# Patient Record
Sex: FEMALE | Race: BLACK OR AFRICAN AMERICAN | NOT HISPANIC OR LATINO | Employment: STUDENT | URBAN - METROPOLITAN AREA
[De-identification: names, ages, dates, MRNs, and addresses within clinical notes are randomized per-mention and may not be internally consistent; named-entity substitution may affect disease eponyms.]

---
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
## 2020-03-17 ENCOUNTER — CLINICAL SUPPORT (OUTPATIENT)
Dept: URGENT CARE | Facility: CLINIC | Age: 20
End: 2020-03-17

## 2020-03-17 VITALS
WEIGHT: 257 LBS | RESPIRATION RATE: 19 BRPM | HEIGHT: 58 IN | BODY MASS INDEX: 53.95 KG/M2 | TEMPERATURE: 98 F | OXYGEN SATURATION: 98 % | DIASTOLIC BLOOD PRESSURE: 76 MMHG | HEART RATE: 92 BPM | SYSTOLIC BLOOD PRESSURE: 120 MMHG

## 2020-03-17 DIAGNOSIS — T78.40XA ALLERGIC REACTION, INITIAL ENCOUNTER: Primary | ICD-10-CM

## 2020-03-17 PROCEDURE — 99204 PR OFFICE/OUTPT VISIT, NEW, LEVL IV, 45-59 MIN: ICD-10-PCS | Mod: 25,S$GLB,, | Performed by: NURSE PRACTITIONER

## 2020-03-17 PROCEDURE — 99204 OFFICE O/P NEW MOD 45 MIN: CPT | Mod: 25,S$GLB,, | Performed by: NURSE PRACTITIONER

## 2020-03-17 RX ORDER — DEXAMETHASONE SODIUM PHOSPHATE 4 MG/ML
8 INJECTION, SOLUTION INTRA-ARTICULAR; INTRALESIONAL; INTRAMUSCULAR; INTRAVENOUS; SOFT TISSUE ONCE
Status: COMPLETED | OUTPATIENT
Start: 2020-03-17 | End: 2020-03-17

## 2020-03-17 RX ORDER — PREDNISONE 20 MG/1
40 TABLET ORAL DAILY
Qty: 10 TABLET | Refills: 0 | Status: SHIPPED | OUTPATIENT
Start: 2020-03-17 | End: 2020-03-22

## 2020-03-17 RX ADMIN — DEXAMETHASONE SODIUM PHOSPHATE 8 MG: 4 INJECTION, SOLUTION INTRA-ARTICULAR; INTRALESIONAL; INTRAMUSCULAR; INTRAVENOUS; SOFT TISSUE at 10:03

## 2020-03-17 NOTE — PATIENT INSTRUCTIONS
General Allergic Reactions  An allergic reaction is a set of symptoms caused by an allergen. An allergen is something that causes a persons immune system to react. When a person comes in contact with an allergen, it causes the body to release chemicals. These include the chemical histamine. Histamine causes swelling and itching. It may affect the entire body. This is called a general allergic reaction. Often symptoms affect only 1 part of the body. This is called a local allergic reaction.  You are having an allergic reaction. Almost anything can cause one. Different people are allergic to different things. It is usually something that you ate or swallowed, came into contact with by getting or putting it on your skin or clothes, or something you breathed in the air. This can be very annoying and sometimes scary.  Most of us think of allergic reactions when we have a rash or itchy skin. Symptoms can include:  · Itching of the eyes, nose, and roof of the mouth  · Runny or stuffy nose  · Watery eyes   · Sneezing or coughing   · A blocked feeling in the ear  · Red, itchy rash called hives  · Red and purple spots  · Rash, redness, welts, blisters  · Itching, burning, stinging, pain  · Dry, flaky, cracking, scaly skin  Severe symptoms include:  · Swelling of the face, lips, or other parts of the body  · Hoarse voice  · Trouble swallowing, feeling like your throat is closing  · Trouble breathing, wheezing  · Nausea, vomiting, diarrhea, stomach cramps  · Feeling faint or lightheaded, rapid heart rate  Sometimes the cause may be obvious. But there are so many things that can cause a reaction that you may not be able to figure out. The most important things to help find your allergen are:  · Remembering when it started  · What you were doing at the time or just before that  · Any activities you were involved in  · Any new products or contacts  Below are some common causes. But remember that almost anything can cause a  reaction. You may not even be aware that you came into contact with one of these things:  · Dust, mold, pollen  · Plants (common ones are poison ivy and poison oak, but there are many others)   · Animals  · Foods such as shrimp, shellfish, peanuts, milk products, gluten, and eggs. Also food colorings, flavorings, and additives.  · Insect bites or stings such as bees, mosquitos, fleas, ticks  · Medicines such as penicillin, sulfa medicines, amoxicillin, aspirin, and ibuprofen. But any medicine can cause a reaction.  · Jewelry such as nickel or gold. This can be new, or something youve worn for a while, including zippers and buttons.  · Latex such as in gloves, clothes, toys, balloons, or some tapes. Some people allergic to latex may also have problems with foods like bananas, avocados, kiwi, papaya, or chestnuts.  · Lotions, perfumes, cosmetics, soaps, shampoos, skincare products, nail products  · Chemicals or dyes in clothing, linen, , hair dyes, soaps, iodine  Many viruses and common colds can cause a rash that is not an allergic reaction. Sometimes it is hard to tell the difference between allergies, sensitivity, or an intolerance to something. This is especially true with food. Many things can cause diarrhea, vomiting, stomach cramps, and skin irritation.  Home care    The goal of treatment is to help relieve the symptoms and get you feeling better. The rash will usually fade over several days. But it can sometimes last a couple of weeks. Over the next couple of days, there may be times when it is gets a little worse, and then better again. Here are some things to do:  · If you know what you are allergic to, stay away from it. Future reactions could be worse than this one.  · Avoid tight clothing and anything that heats up your skin (hot showers or baths, direct sunlight). Heat will make itching worse.  · An ice pack will relieve local areas of intense itching and redness. To make an ice pack, put ice  cubes in a plastic bag that seals at the top. Wrap it in a thin, clean towel. Dont put the ice directly on the skin because it can damage the skin.  · Oral diphenhydramine is an over-the-counter antihistamine sold at pharmacy and grocery stores. Unless a prescription antihistamine was given, diphenhydramine may be used to reduce itching if large areas of the skin are involved. It may make you sleepy. So be careful using it in the daytime or when going to school, working, or driving. Note: Dont use diphenhydramine if you have glaucoma or if you are a man with trouble urinating due to an enlarged prostate. There are other antihistamines that wont make you so sleepy. These are good choices for daytime use. Ask your pharmacist for suggestions.  · Dont use diphenhydramine cream on your skin. It can cause a further reaction in some people.  · To help prevent an infection, don't scratch the affected area. Scratching may worsen the reaction and damage your skin. It can also lead to an infection. Always check the affected for signs of an infection.  · Call your healthcare provider and ask what you can use to help decrease the itching.  · To decrease allergic reactions, try the following:    · Use heat-steam to clean your home  · Use high-efficiency particulate (HEPA) vacuums and filters  · Stay away from food and pet triggers  · Kill any cockroaches  · Clean your house often  Follow-up care  Follow up with your healthcare provider, or as advised. If you had a severe reaction today, or if you have had several mild to medium allergic reactions in the past, ask your provider about allergy testing. This can help you find out what you are allergic to. If your reaction included dizziness, fainting, or trouble breathing or swallowing, ask your provider about carrying auto-injectable epinephrine.  Call 911  Call 911 if any of these occur:  · Trouble breathing or swallowing, wheezing  · Cool, moist, pale skin  · Shortness of  breath  · Hoarse voice or trouble speaking  · Confused   · Very drowsy or trouble awakening  · Fainting or loss of consciousness  · Rapid heart rate  · Feeling of dizziness or weakness or a sudden drop in blood pressure  · Feeling of doom  · Feeling lightheaded  · Severe nausea or vomiting, or diarrhea  · Seizure  · Swelling in the face, eyelids, lips, mouth, throat or tongue  · Drooling  When to seek medical advice  Call your healthcare provider right away if any of these occur:  · Spreading areas of itching, redness or swelling  · Nausea or stomach cramps or abdominal pain  · Continuing or recurring symptoms  · Spreading areas of redness, swelling, or itching  · Signs of infection at the affected site:  ¨ Spreading redness  ¨ Increased pain or swelling  ¨ Fluid or colored drainage from the site  ¨ Fever of 100.4°F (38°C) or above lasting for 24 to 48 hours, or as directed by your provider  Date Last Reviewed: 3/1/2017  © 8083-3471 [employer]. [address] [place] [address], [place] [address]. All rights reserved. This information is not intended as a substitute for professional medical care. Always follow your healthcare professional's instructions.

## 2020-03-17 NOTE — PROGRESS NOTES
"Subjective:       Patient ID: Britta Mariee is a 19 y.o. female.    Vitals:  height is 4' 10" (1.473 m) and weight is 116.6 kg (257 lb). Her temperature is 98.2 °F (36.8 °C). Her blood pressure is 120/76 and her pulse is 92. Her respiration is 19 and oxygen saturation is 98%.     Chief Complaint: Allergic Reaction    Allergic Reaction   Episode onset: last night  Associated with: hair dye  Associated symptoms include itching and a rash. Pertinent negatives include no chest pain, coughing, diarrhea or vomiting. Her rash is characterized by: diffuse and raised.Treatments tried: benadryl. The treatment provided no relief. There is no history of seasonal allergies. There is no swelling present.       Constitution: Negative for chills, fatigue and fever.   HENT: Negative for congestion and sore throat.    Neck: Negative for painful lymph nodes.   Cardiovascular: Negative for chest pain and leg swelling.   Eyes: Negative for double vision and blurred vision.   Respiratory: Negative for cough and shortness of breath.    Gastrointestinal: Negative for nausea, vomiting and diarrhea.   Genitourinary: Negative for dysuria, frequency, urgency and history of kidney stones.   Musculoskeletal: Negative for joint pain, joint swelling, muscle cramps and muscle ache.   Skin: Positive for rash and erythema. Negative for color change, pale and bruising.   Allergic/Immunologic: Negative for seasonal allergies.   Neurological: Negative for dizziness, history of vertigo, light-headedness, passing out and headaches.   Hematologic/Lymphatic: Negative for swollen lymph nodes.   Psychiatric/Behavioral: Negative for nervous/anxious, sleep disturbance and depression. The patient is not nervous/anxious.        Objective:      Physical Exam   Constitutional: She is oriented to person, place, and time. She appears well-developed and well-nourished. She is cooperative.  Non-toxic appearance. She does not appear ill. No distress.   HENT: "   Head: Normocephalic and atraumatic.   Right Ear: Hearing, tympanic membrane, external ear and ear canal normal.   Left Ear: Hearing, tympanic membrane, external ear and ear canal normal.   Nose: Nose normal. No mucosal edema, rhinorrhea or nasal deformity. No epistaxis. Right sinus exhibits no maxillary sinus tenderness and no frontal sinus tenderness. Left sinus exhibits no maxillary sinus tenderness and no frontal sinus tenderness.   Mouth/Throat: Uvula is midline, oropharynx is clear and moist and mucous membranes are normal. No trismus in the jaw. Normal dentition. No uvula swelling. No posterior oropharyngeal erythema.   Eyes: Pupils are equal, round, and reactive to light. Conjunctivae, EOM and lids are normal. Right eye exhibits no discharge. Left eye exhibits no discharge. No scleral icterus.   Neck: Trachea normal, normal range of motion, full passive range of motion without pain and phonation normal. Neck supple.   Cardiovascular: Normal rate, regular rhythm, normal heart sounds, intact distal pulses and normal pulses.   Pulmonary/Chest: Effort normal and breath sounds normal. No respiratory distress. She has no rales. She exhibits no tenderness.   Abdominal: Soft. Normal appearance and bowel sounds are normal. She exhibits no distension, no pulsatile midline mass and no mass. There is no tenderness.   Musculoskeletal: Normal range of motion. She exhibits no edema or deformity.   Neurological: She is alert and oriented to person, place, and time. She exhibits normal muscle tone. Coordination normal.   Skin: Skin is warm, dry, intact, not diaphoretic, not pale and rash.   Erythematous urticarial rash to scalp. Lesions:  erythema  Psychiatric: She has a normal mood and affect. Her speech is normal and behavior is normal. Judgment and thought content normal. Cognition and memory are normal.   Nursing note and vitals reviewed.        Assessment:       1. Allergic reaction, initial encounter        Plan:      decadron injection, rx; prednisone.    Allergic reaction, initial encounter  -     predniSONE (DELTASONE) 20 MG tablet; Take 2 tablets (40 mg total) by mouth once daily. for 5 days  Dispense: 10 tablet; Refill: 0

## 2020-12-16 ENCOUNTER — OCCUPATIONAL HEALTH (OUTPATIENT)
Dept: URGENT CARE | Facility: CLINIC | Age: 20
End: 2020-12-16

## 2020-12-16 PROCEDURE — 86580 PR  TB INTRADERMAL TEST: ICD-10-PCS | Mod: S$GLB,,, | Performed by: EMERGENCY MEDICINE

## 2020-12-16 PROCEDURE — 86580 TB INTRADERMAL TEST: CPT | Mod: S$GLB,,, | Performed by: EMERGENCY MEDICINE

## 2020-12-16 PROCEDURE — 80305 DRUG TEST PRSMV DIR OPT OBS: CPT | Mod: S$GLB,,, | Performed by: EMERGENCY MEDICINE

## 2020-12-16 PROCEDURE — 80305 PR NON-DOT DRUG SCREENS: ICD-10-PCS | Mod: S$GLB,,, | Performed by: EMERGENCY MEDICINE

## 2021-04-29 ENCOUNTER — PATIENT MESSAGE (OUTPATIENT)
Dept: RESEARCH | Facility: HOSPITAL | Age: 21
End: 2021-04-29

## 2022-01-24 ENCOUNTER — HOSPITAL ENCOUNTER (EMERGENCY)
Facility: HOSPITAL | Age: 22
Discharge: HOME | End: 2022-01-24
Attending: EMERGENCY MEDICINE
Payer: MEDICAID

## 2022-01-24 VITALS
SYSTOLIC BLOOD PRESSURE: 118 MMHG | RESPIRATION RATE: 17 BRPM | TEMPERATURE: 98 F | OXYGEN SATURATION: 99 % | HEART RATE: 94 BPM | DIASTOLIC BLOOD PRESSURE: 72 MMHG | WEIGHT: 257 LBS | BODY MASS INDEX: 53.71 KG/M2

## 2022-01-24 DIAGNOSIS — S92.212A CLOSED DISPLACED FRACTURE OF CUBOID OF LEFT FOOT, INITIAL ENCOUNTER: Primary | ICD-10-CM
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
## 2022-01-24 PROCEDURE — 25000003 PHARM REV CODE 250: Performed by: EMERGENCY MEDICINE

## 2022-01-24 PROCEDURE — 99284 EMERGENCY DEPT VISIT MOD MDM: CPT | Mod: 25

## 2022-01-24 PROCEDURE — 29515 APPLICATION SHORT LEG SPLINT: CPT | Mod: LT

## 2022-01-24 RX ORDER — HYDROCODONE BITARTRATE AND ACETAMINOPHEN 10; 325 MG/1; MG/1
1 TABLET ORAL
Status: COMPLETED | OUTPATIENT
Start: 2022-01-24 | End: 2022-01-24

## 2022-01-24 RX ORDER — HYDROCODONE BITARTRATE AND ACETAMINOPHEN 10; 325 MG/1; MG/1
1 TABLET ORAL EVERY 6 HOURS PRN
Qty: 12 TABLET | Refills: 0 | Status: SHIPPED | OUTPATIENT
Start: 2022-01-24 | End: 2022-01-27

## 2022-01-24 RX ADMIN — HYDROCODONE BITARTRATE AND ACETAMINOPHEN 1 TABLET: 10; 325 TABLET ORAL at 06:01

## 2022-01-24 NOTE — DISCHARGE INSTRUCTIONS
Please elevate her leg as much as possible.  Please follow-up with the foot specialist.  You may need surgery for this particular fracture of your foot.  Please follow the instructions given above.

## 2022-01-24 NOTE — ED PROVIDER NOTES
Encounter Date: 1/24/2022       History     Chief Complaint   Patient presents with    Fall     Mechanical trip over dog and scraped leg. Pt denies hit to head or loc.      Chief complaint is ankle pain.  The patient states she tripped at her house over a dog while walking down the sidewalk at 9:00 p.m. and now is here for evaluation for swelling to the left foot.  The patient on exam while in the stretcher definitely has swelling to the dorsum of the left foot and to the base of the 5th meta tarsal.  She denies hitting her head.  No complaints of head pain neck pain chest pain.  She only has pain to the left foot.  She arrived by ambulance.        Review of patient's allergies indicates:  No Known Allergies  Past Medical History:   Diagnosis Date    Asthma      Past Surgical History:   Procedure Laterality Date    WISDOM TOOTH EXTRACTION       Family History   Problem Relation Age of Onset    Hypertension Sister     Breast cancer Neg Hx     Cancer Neg Hx     Colon cancer Neg Hx     Miscarriages / Stillbirths Neg Hx     Diabetes Neg Hx      Social History     Tobacco Use    Smoking status: Never Smoker   Substance Use Topics    Alcohol use: No    Drug use: No     Review of Systems   Constitutional: Negative for chills and fever.   HENT: Negative for ear pain, rhinorrhea and sore throat.    Eyes: Negative for pain and visual disturbance.   Respiratory: Negative for cough and shortness of breath.    Cardiovascular: Negative for chest pain and palpitations.   Gastrointestinal: Negative for abdominal pain, constipation, diarrhea, nausea and vomiting.   Genitourinary: Negative for dysuria, frequency, hematuria and urgency.   Musculoskeletal: Negative for back pain, joint swelling and myalgias.        Left foot pain   Skin: Negative for rash.   Neurological: Negative for dizziness, seizures, weakness and headaches.   Psychiatric/Behavioral: Negative for dysphoric mood. The patient is not nervous/anxious.         Physical Exam     Initial Vitals [01/24/22 0437]   BP Pulse Resp Temp SpO2   118/72 94 18 98.4 °F (36.9 °C) 99 %      MAP       --         Physical Exam    Nursing note and vitals reviewed.  Constitutional: She appears well-developed and well-nourished.   HENT:   Head: Normocephalic and atraumatic.   Eyes: Conjunctivae, EOM and lids are normal. Pupils are equal, round, and reactive to light.   Neck: Trachea normal. Neck supple. No thyroid mass and no thyromegaly present.   Normal range of motion.  Cardiovascular: Normal rate, regular rhythm and normal heart sounds.   Pulmonary/Chest: Effort normal and breath sounds normal.   Abdominal: Abdomen is soft. Normal appearance. There is no abdominal tenderness.   Musculoskeletal:         General: Normal range of motion.      Cervical back: Normal range of motion and neck supple.      Comments: Patient tender over the dorsum of the mid left foot and at the base of the 5th meta tarsal.  Neurovascular function intact.  No skin tear noted.  Neurovascular function intact     Neurological: She is alert and oriented to person, place, and time. She has normal strength and normal reflexes. No cranial nerve deficit or sensory deficit.   Skin: Skin is warm and dry.   Psychiatric: She has a normal mood and affect. Her speech is normal and behavior is normal. Judgment and thought content normal.         ED Course   Procedures  Labs Reviewed - No data to display       Imaging Results          CT Foot Without Contrast Left (Final result)  Result time 01/24/22 07:02:42    Final result by Dae Chase IV, MD (01/24/22 07:02:42)                 Narrative:    CMS MANDATED QUALITY DATA - CT RADIATION 436    All CT scans at this facility utilize dose modulation, iterative reconstruction, and/or weight based dosing when appropriate to reduce radiation dose to as low as reasonably achievable.    CT of the foot without contrast    HISTORY: Fracture.    Thin section imaging is performed  and viewed in multiple planes.    There is an acute comminuted fracture involving the cuboid. A longitudinally oriented component of the fracture extends from the proximal to the distal articular surfaces. The fracture cleft measures up to 4 to 5 mm. There are additional fracture lines extending to the dorsal and plantar surfaces as well as the articulation with the third cuneiform. There appears to be a small nondisplaced fracture along the anterior process of the calcaneus. The remaining osseous structures appear intact. No dislocation or osseous destructive process is observed.    There is edema within the soft tissues of the foot.    IMPRESSION:    Acute comminuted fracture of the cuboid with extension to articular surfaces as above. There is slight displacement of fracture fragments. Longitudinally oriented fracture cleft measures up to 4 to 5 mm in width.    Probable small nondisplaced fracture involving the far anterior aspect of the calcaneus.    Soft tissue edema.    Electronically signed by:  Dae Chase MD  1/24/2022 7:02 AM CST Workstation: [id number]                             X-Ray Ankle Complete Left (Final result)  Result time 01/24/22 07:03:29    Final result by Dae Chase IV, MD (01/24/22 07:03:29)                 Narrative:    Left ankle, 3 views    HISTORY: Ankle pain.    The bones are appropriately mineralized. The joint space appears well-maintained. No acute fracture, dislocation or osseous destruction is observed.    IMPRESSION:    No acute osseous abnormality.    Electronically signed by:  Dae Chase MD  1/24/2022 7:03 AM CST Workstation: [id number]                             X-Ray Foot Complete Left (Final result)  Result time 01/24/22 07:04:59    Final result by Dae Chase IV, MD (01/24/22 07:04:59)                 Narrative:    Left foot, 3 views    HISTORY: Recent trauma, foot pain.    There are linear lucencies extending through the cuboid bone compatible with an acute  mildly comminuted fracture. No additional fracture is observed and no dislocation or osseous destruction is demonstrated. There is soft tissue swelling within the foot.    IMPRESSION:    Acute comminuted cuboid fracture.    Electronically signed by:  Dae Chase MD  1/24/2022 7:04 AM CST Workstation: [id number]                               Medications   HYDROcodone-acetaminophen  mg per tablet 1 tablet (1 tablet Oral Given 1/24/22 0602)     Medical Decision Making:   ED Management:  The patient was splinted because she has a fracture of her cuboid.                      Clinical Impression:   Final diagnoses:  [W19.XXXA] Fall  [S92.212A] Closed displaced fracture of cuboid of left foot, initial encounter (Primary)          ED Disposition Condition    Discharge Stable        ED Prescriptions     Medication Sig Dispense Start Date End Date Auth. Provider    HYDROcodone-acetaminophen (NORCO)  mg per tablet Take 1 tablet by mouth every 6 (six) hours as needed for Pain. 12 tablet 1/24/2022 1/27/2022 Torito Weinstein MD        Follow-up Information     Follow up With Specialties Details Why Contact Info    Medardo Shell DPM Podiatry, Surgery Schedule an appointment as soon as possible for a visit in 2 days  [address] [facility]  [address]  [facility] [address]  [phone]             Torito Weinstein MD  01/25/22 0139

## 2022-01-27 NOTE — PATIENT INSTRUCTIONS
Foot Fracture  You have a broken bone (fracture) in your foot. This will cause pain, swelling, and often bruising. It will usually take about 4 to 8 weeks to heal. A foot fracture may be treated with a special shoe, splint, cast, or boot.  Home care  Follow these guidelines when caring for yourself at home:  · You may be given a splint, cast, shoe, or boot to keep the injured area from moving. Unless you were told otherwise, use crutches or a walker. Dont put weight on the injured foot until your health care provider says you can do so. (You can rent crutches and a walker at many pharmacies and surgical or orthopedic supply stores.) Dont put weight on a splint, or it will break.  · Keep your leg elevated to reduce pain and swelling. When sleeping, put a pillow under the injured leg. When sitting, support the injured leg so it is above your waist. This is very important during the first 2 days (48 hours).  · Put an ice pack on the injured area. Do this for 20 minutes every 1 to 2 hours the first day for pain relief. You can make an ice pack by wrapping a plastic bag of ice cubes in a thin towel. As the ice melts, be careful that the splint, cast, boot, or shoe doesnt get wet. You can place the ice pack directly over the splint or cast. Unless told otherwise, you can open the boot or shoe to apply the ice pack. Continue using the ice pack 3 to 4 times a day for the next 2 days. Then use the ice pack as needed to ease pain and swelling.  · Keep the splint, cast, boot, or shoe dry. When bathing, protect it with a large plastic bag, rubber-banded at the top end. If a fiberglass splint or cast or boot gets wet, you can dry it with a hair dryer. Unless told otherwise, you can take off the boot or shoe to bathe.  · You may use acetaminophen or ibuprofen to control pain, unless another pain medicine was prescribed. If you have chronic liver or kidney disease, talk with your healthcare provider before using these  medicines. Also talk with your provider if youve had a stomach ulcer or gastrointestinal bleeding.  · Dont put creams or objects under the cast if you have itching.  Follow-up care  Follow up with your healthcare provider, or as advised. This is to make sure the bone is healing the way it should. If you were given a splint, it may be changed to a cast or boot at your follow-up visit.  X-rays may be taken. You will be told of any new findings that may affect your care.  When to seek medical advice  Call your healthcare provider right away if any of these occur:  · The cast or splint cracks  · The plaster cast or splint becomes wet or soft  · The fiberglass cast or splint stays wet for more than 24 hours  · Bad odor from the cast or wound fluid stains the cast  · Tightness or pain under the cast or splint gets worse  · Toes become swollen, cold, blue, numb, or tingly  · You cant move your toes  · Skin around cast or splint becomes red  · Fever of 100.4ºF (38ºC) or higher, or as directed by your healthcare provider  Date Last Reviewed: 2/1/2017  © 1704-2282 [employer]. [address] [place] [address], [place] [address]. All rights reserved. This information is not intended as a substitute for professional medical care. Always follow your healthcare professional's instructions.

## 2022-01-28 ENCOUNTER — OFFICE VISIT (OUTPATIENT)
Dept: PODIATRY | Facility: CLINIC | Age: 22
End: 2022-01-28

## 2022-01-28 VITALS
BODY MASS INDEX: 53.95 KG/M2 | RESPIRATION RATE: 18 BRPM | WEIGHT: 257 LBS | SYSTOLIC BLOOD PRESSURE: 114 MMHG | HEART RATE: 90 BPM | OXYGEN SATURATION: 100 % | HEIGHT: 58 IN | DIASTOLIC BLOOD PRESSURE: 70 MMHG

## 2022-01-28 DIAGNOSIS — M79.672 LEFT FOOT PAIN: ICD-10-CM

## 2022-01-28 DIAGNOSIS — S92.212A CLOSED DISPLACED FRACTURE OF CUBOID OF LEFT FOOT, INITIAL ENCOUNTER: Primary | ICD-10-CM

## 2022-01-28 DIAGNOSIS — S99.922A INJURY OF LEFT FOOT, INITIAL ENCOUNTER: ICD-10-CM

## 2022-01-28 DIAGNOSIS — S92.212A: ICD-10-CM

## 2022-01-28 PROCEDURE — 1159F MED LIST DOCD IN RCRD: CPT | Mod: CPTII,S$GLB,, | Performed by: PODIATRIST

## 2022-01-28 PROCEDURE — 99203 PR OFFICE/OUTPT VISIT, NEW, LEVL III, 30-44 MIN: ICD-10-PCS | Mod: S$GLB,,, | Performed by: PODIATRIST

## 2022-01-28 PROCEDURE — 3078F DIAST BP <80 MM HG: CPT | Mod: CPTII,S$GLB,, | Performed by: PODIATRIST

## 2022-01-28 PROCEDURE — 1159F PR MEDICATION LIST DOCUMENTED IN MEDICAL RECORD: ICD-10-PCS | Mod: CPTII,S$GLB,, | Performed by: PODIATRIST

## 2022-01-28 PROCEDURE — 1160F RVW MEDS BY RX/DR IN RCRD: CPT | Mod: CPTII,S$GLB,, | Performed by: PODIATRIST

## 2022-01-28 PROCEDURE — 3008F PR BODY MASS INDEX (BMI) DOCUMENTED: ICD-10-PCS | Mod: CPTII,S$GLB,, | Performed by: PODIATRIST

## 2022-01-28 PROCEDURE — 3078F PR MOST RECENT DIASTOLIC BLOOD PRESSURE < 80 MM HG: ICD-10-PCS | Mod: CPTII,S$GLB,, | Performed by: PODIATRIST

## 2022-01-28 PROCEDURE — 99203 OFFICE O/P NEW LOW 30 MIN: CPT | Mod: S$GLB,,, | Performed by: PODIATRIST

## 2022-01-28 PROCEDURE — 3074F SYST BP LT 130 MM HG: CPT | Mod: CPTII,S$GLB,, | Performed by: PODIATRIST

## 2022-01-28 PROCEDURE — 3074F PR MOST RECENT SYSTOLIC BLOOD PRESSURE < 130 MM HG: ICD-10-PCS | Mod: CPTII,S$GLB,, | Performed by: PODIATRIST

## 2022-01-28 PROCEDURE — 3008F BODY MASS INDEX DOCD: CPT | Mod: CPTII,S$GLB,, | Performed by: PODIATRIST

## 2022-01-28 PROCEDURE — 1160F PR REVIEW ALL MEDS BY PRESCRIBER/CLIN PHARMACIST DOCUMENTED: ICD-10-PCS | Mod: CPTII,S$GLB,, | Performed by: PODIATRIST

## 2022-01-28 RX ORDER — HYDROCODONE BITARTRATE AND ACETAMINOPHEN 5; 325 MG/1; MG/1
1 TABLET ORAL EVERY 6 HOURS PRN
Qty: 28 TABLET | Refills: 0 | Status: SHIPPED | OUTPATIENT
Start: 2022-01-28 | End: 2022-02-15

## 2022-01-28 RX ORDER — HYDROCODONE BITARTRATE AND ACETAMINOPHEN 10; 325 MG/1; MG/1
1 TABLET ORAL EVERY 6 HOURS PRN
Qty: 28 TABLET | Refills: 0 | Status: CANCELLED | OUTPATIENT
Start: 2022-01-28

## 2022-01-28 NOTE — PROGRESS NOTES
"  [address] [facility] [address]  [place] [address]  Phone: [phone]   [phone]    Patient's PCP:CORKY Gayle  Referring Provider: [facility]*    Subjective:      Chief Complaint:: Foot Injury (Closed displaced fracture of cuboid of left foot)    FAISAL Mariee is a 21 y.o. female who presents with a complaint of Closed displaced fracture of cuboid of left foot lasting for 1/23/2022. Onset of symptoms twisted ankle and fell while wearing platform shoes and reports trauma.  Current symptoms include pain, swelling, limited mobility, and discoloration .  Aggravating factors are walking weightbearing cold tempeture, applied pressure. Symptoms have remained. Treatment to date have included ER vist, x-rays, CT scan, Motrin, and hydrocodone.        Vitals:    01/28/22 0924   BP: 114/70   Pulse: 90   Resp: 18   SpO2: 100%   Weight: 116.6 kg (257 lb)   Height: 4' 10" (1.473 m)   PainSc:   9      Shoe Size: 5    Past Surgical History:   Procedure Laterality Date    WISDOM TOOTH EXTRACTION       Past Medical History:   Diagnosis Date    Asthma      Family History   Problem Relation Age of Onset    Hypertension Sister     Breast cancer Neg Hx     Cancer Neg Hx     Colon cancer Neg Hx     Miscarriages / Stillbirths Neg Hx     Diabetes Neg Hx         Social History:   Marital Status: Single  Alcohol History:  reports no history of alcohol use.  Tobacco History:  reports that she has never smoked. She has never used smokeless tobacco.  Drug History:  reports no history of drug use.    Review of patient's allergies indicates:  No Known Allergies    Current Outpatient Medications   Medication Sig Dispense Refill    albuterol 90 mcg/actuation inhaler Inhale 1-2 puffs into the lungs every 6 (six) hours as needed for Wheezing. Rescue 1 Inhaler 0    albuterol-ipratropium 2.5mg-0.5mg/3mL (DUO-NEB) 0.5 mg-3 mg(2.5 mg base)/3 mL nebulizer solution Take 3 mLs by nebulization every 6 " (six) hours as needed for Wheezing. Rescue 1 Box 0    HYDROcodone-acetaminophen (NORCO) 5-325 mg per tablet Take 1 tablet by mouth every 6 (six) hours as needed for Pain. 28 tablet 0     No current facility-administered medications for this visit.       Review of Systems   Constitutional: Negative for chills, fatigue, fever and unexpected weight change.   HENT: Negative for hearing loss and trouble swallowing.    Eyes: Negative for photophobia and visual disturbance.   Respiratory: Negative for cough, shortness of breath and wheezing.    Cardiovascular: Negative for chest pain, palpitations and leg swelling.   Gastrointestinal: Negative for abdominal pain and nausea.   Genitourinary: Negative for dysuria and frequency.   Musculoskeletal: Positive for arthralgias, gait problem, joint swelling and myalgias. Negative for back pain.   Skin: Positive for color change. Negative for rash and wound.   Neurological: Negative for tremors, seizures, weakness, numbness and headaches.   Hematological: Does not bruise/bleed easily.         Objective:        Physical Exam:   Foot Exam    General  General Appearance: appears stated age and healthy   Orientation: alert and oriented to person, place, and time   Affect: appropriate   Gait: antalgic   Assistance: (Crutches)      Left Foot/Ankle      Inspection and Palpation  Left foot ecchymosis: Dorsal lateral foot.  Tenderness: (Lateral foot overlying the cuboid)  Swelling: (Moderate edema to the foot)  Arch: normal  Skin Exam: skin intact;   Neurovascular  Dorsalis pedis: 2+  Posterior tibial: 2+  Capillary refill: 2+  Varicose veins: not present  Saphenous nerve sensation: normal  Tibial nerve sensation: normal  Superficial peroneal nerve sensation: normal  Deep peroneal nerve sensation: normal  Sural nerve sensation: normal    Edema  Type of edema: non-pitting    Muscle Strength  Ankle dorsiflexion: 5  Ankle plantar flexion: 5  Ankle inversion: 5  Ankle eversion: 5  Great toe  extension: 5  Great toe flexion: 5    Range of Motion    Passive  Ankle dorsiflexion: pain  Plantar flexion: pain  Ankle eversion: pain  Ankle inversion: pain    Active  Ankle dorsiflexion: pain  Plantar flexion: pain  Ankle eversion: pain  Ankle inversion: pain    Tests  Anterior drawer: negative   Talar tilt: negative   PT Tinel's sign: negative  Paresthesia: negative        Physical Exam  Cardiovascular:      Pulses:           Dorsalis pedis pulses are 2+ on the left side.        Posterior tibial pulses are 2+ on the left side.         Imaging: X-Ray Foot Complete Left  Left foot, 3 views    HISTORY: Recent trauma, foot pain.    There are linear lucencies extending through the cuboid bone compatible with an acute mildly comminuted fracture. No additional fracture is observed and no dislocation or osseous destruction is demonstrated. There is soft tissue swelling within the foot.    IMPRESSION:    Acute comminuted cuboid fracture.    Electronically signed by:  Dae Chase MD  1/24/2022 7:04 AM CST Workstation: [id number]  X-Ray Ankle Complete Left  Left ankle, 3 views    HISTORY: Ankle pain.    The bones are appropriately mineralized. The joint space appears well-maintained. No acute fracture, dislocation or osseous destruction is observed.    IMPRESSION:    No acute osseous abnormality.    Electronically signed by:  Dae Chase MD  1/24/2022 7:03 AM CST Workstation: [id number]  CT Foot Without Contrast Left  CMS MANDATED QUALITY DATA - CT RADIATION 436    All CT scans at this facility utilize dose modulation, iterative reconstruction, and/or weight based dosing when appropriate to reduce radiation dose to as low as reasonably achievable.    CT of the foot without contrast    HISTORY: Fracture.    Thin section imaging is performed and viewed in multiple planes.    There is an acute comminuted fracture involving the cuboid. A longitudinally oriented component of the fracture extends from the proximal to the  distal articular surfaces. The fracture cleft measures up to 4 to 5 mm. There are additional fracture lines extending to the dorsal and plantar surfaces as well as the articulation with the third cuneiform. There appears to be a small nondisplaced fracture along the anterior process of the calcaneus. The remaining osseous structures appear intact. No dislocation or osseous destructive process is observed.    There is edema within the soft tissues of the foot.    IMPRESSION:    Acute comminuted fracture of the cuboid with extension to articular surfaces as above. There is slight displacement of fracture fragments. Longitudinally oriented fracture cleft measures up to 4 to 5 mm in width.    Probable small nondisplaced fracture involving the far anterior aspect of the calcaneus.    Soft tissue edema.    Electronically signed by:  Dae Chase MD  1/24/2022 7:02 AM CST Workstation: [id number]             Assessment:       1. Closed displaced fracture of cuboid of left foot, initial encounter    2. Injury of left foot, initial encounter    3. Left foot pain      Plan:   Closed displaced fracture of cuboid of left foot, initial encounter  -     HYDROcodone-acetaminophen (NORCO) 5-325 mg per tablet; Take 1 tablet by mouth every 6 (six) hours as needed for Pain.  Dispense: 28 tablet; Refill: 0    Injury of left foot, initial encounter    Left foot pain  -     HYDROcodone-acetaminophen (NORCO) 5-325 mg per tablet; Take 1 tablet by mouth every 6 (six) hours as needed for Pain.  Dispense: 28 tablet; Refill: 0      Follow up in about 1 week (around 2/4/2022), or if symptoms worsen or fail to improve.    Procedures        CAM walker boot with non-weight bearing  Ice to painful area, 15 minutes at a time  Ace-wrap to help with swelling  No barefoot   Keep affected extremity elevated while seated    Treatment options discussed with the patient.  Patient was unsure of proceeding with conservative versus surgical management.  I am  going to have her return in 1 week to evaluate for swelling and will likely make plans for open reduction with internal fixation of the cuboid.    Counseling:     I provided patient education verbally regarding:   Patient diagnosis, treatment options, as well as alternatives, risks, and benefits.     I discussed with the pt. the type of fracture and the treatment and time required for healing of the the specific type fracture.      This note was created using Dragon voice recognition software that occasionally misinterpreted phrases or words.

## 2022-01-31 ENCOUNTER — TELEPHONE (OUTPATIENT)
Dept: PODIATRY | Facility: CLINIC | Age: 22
End: 2022-01-31
Payer: MEDICAID

## 2022-01-31 NOTE — TELEPHONE ENCOUNTER
Spoke with patient and she stated that she had some cramping in her leg when wearing the boot. Explained that she could remove the boot while laying on couch or bed, but for all moving around she needed to wear the boot. Patient had no complaints of calf being hot to touch or localized pain. Explained that if patient had any more problems to call the office or go directly to emergency room.  Patient verbalizes understanding.

## 2022-01-31 NOTE — TELEPHONE ENCOUNTER
----- Message from Betina Perez sent at 1/31/2022  8:55 AM CST -----  Regarding: Pt call  Please return patients call at [phone]. She is having some pain in her leg on the same side that she has a fractured foot. She asked to speak to a nurse about it.    Thank you,  Betina

## 2022-02-04 ENCOUNTER — OFFICE VISIT (OUTPATIENT)
Dept: PODIATRY | Facility: CLINIC | Age: 22
End: 2022-02-04
Payer: MEDICAID

## 2022-02-04 VITALS
WEIGHT: 257 LBS | BODY MASS INDEX: 53.95 KG/M2 | HEART RATE: 84 BPM | DIASTOLIC BLOOD PRESSURE: 68 MMHG | SYSTOLIC BLOOD PRESSURE: 110 MMHG | RESPIRATION RATE: 16 BRPM | OXYGEN SATURATION: 99 % | HEIGHT: 58 IN

## 2022-02-04 DIAGNOSIS — S92.212D CLOSED DISPLACED FRACTURE OF CUBOID OF LEFT FOOT WITH ROUTINE HEALING, SUBSEQUENT ENCOUNTER: Primary | ICD-10-CM

## 2022-02-04 DIAGNOSIS — M79.672 LEFT FOOT PAIN: ICD-10-CM

## 2022-02-04 DIAGNOSIS — S99.922D INJURY OF LEFT FOOT, SUBSEQUENT ENCOUNTER: ICD-10-CM

## 2022-02-04 PROCEDURE — 3008F BODY MASS INDEX DOCD: CPT | Mod: CPTII,S$GLB,, | Performed by: PODIATRIST

## 2022-02-04 PROCEDURE — 3078F PR MOST RECENT DIASTOLIC BLOOD PRESSURE < 80 MM HG: ICD-10-PCS | Mod: CPTII,S$GLB,, | Performed by: PODIATRIST

## 2022-02-04 PROCEDURE — 3074F PR MOST RECENT SYSTOLIC BLOOD PRESSURE < 130 MM HG: ICD-10-PCS | Mod: CPTII,S$GLB,, | Performed by: PODIATRIST

## 2022-02-04 PROCEDURE — 99214 OFFICE O/P EST MOD 30 MIN: CPT | Mod: S$GLB,,, | Performed by: PODIATRIST

## 2022-02-04 PROCEDURE — 99214 PR OFFICE/OUTPT VISIT, EST, LEVL IV, 30-39 MIN: ICD-10-PCS | Mod: S$GLB,,, | Performed by: PODIATRIST

## 2022-02-04 PROCEDURE — 1159F PR MEDICATION LIST DOCUMENTED IN MEDICAL RECORD: ICD-10-PCS | Mod: CPTII,S$GLB,, | Performed by: PODIATRIST

## 2022-02-04 PROCEDURE — 1159F MED LIST DOCD IN RCRD: CPT | Mod: CPTII,S$GLB,, | Performed by: PODIATRIST

## 2022-02-04 PROCEDURE — 3008F PR BODY MASS INDEX (BMI) DOCUMENTED: ICD-10-PCS | Mod: CPTII,S$GLB,, | Performed by: PODIATRIST

## 2022-02-04 PROCEDURE — 3074F SYST BP LT 130 MM HG: CPT | Mod: CPTII,S$GLB,, | Performed by: PODIATRIST

## 2022-02-04 PROCEDURE — 1160F RVW MEDS BY RX/DR IN RCRD: CPT | Mod: CPTII,S$GLB,, | Performed by: PODIATRIST

## 2022-02-04 PROCEDURE — 1160F PR REVIEW ALL MEDS BY PRESCRIBER/CLIN PHARMACIST DOCUMENTED: ICD-10-PCS | Mod: CPTII,S$GLB,, | Performed by: PODIATRIST

## 2022-02-04 PROCEDURE — 3078F DIAST BP <80 MM HG: CPT | Mod: CPTII,S$GLB,, | Performed by: PODIATRIST

## 2022-02-04 NOTE — PROGRESS NOTES
[address] [facility] [address]  [place] [address]  Phone: [phone]   [phone]      Subjective:      Chief Complaint:  Follow-up left cuboid fracture    HPI:    Britta Mariee is a 21 y.o. female who returns to the clinic today for follow-up on Closed displaced fracture of cuboid of left foot and is ready for surgical intervention. Pre-op clearance by internist. Patient states increased pain and bruising.   She has remained nonweightbearing in Cam walker boot with crutch assistance.     Vitals:    02/04/22 1111   BP: 110/68   Pulse: 84   Resp: 16     Shoe Size:    Past Surgical History:   Procedure Laterality Date    WISDOM TOOTH EXTRACTION       Past Medical History:   Diagnosis Date    Asthma      Family History   Problem Relation Age of Onset    Hypertension Sister     Breast cancer Neg Hx     Cancer Neg Hx     Colon cancer Neg Hx     Miscarriages / Stillbirths Neg Hx     Diabetes Neg Hx         Social History:   Marital Status: Single  Alcohol History:  reports no history of alcohol use.  Tobacco History:  reports that she has never smoked. She has never used smokeless tobacco.  Drug History:  reports no history of drug use.    Review of patient's allergies indicates:  No Known Allergies    Current Outpatient Medications   Medication Sig Dispense Refill    albuterol 90 mcg/actuation inhaler Inhale 1-2 puffs into the lungs every 6 (six) hours as needed for Wheezing. Rescue 1 Inhaler 0    albuterol-ipratropium 2.5mg-0.5mg/3mL (DUO-NEB) 0.5 mg-3 mg(2.5 mg base)/3 mL nebulizer solution Take 3 mLs by nebulization every 6 (six) hours as needed for Wheezing. Rescue 1 Box 0    HYDROcodone-acetaminophen (NORCO) 5-325 mg per tablet Take 1 tablet by mouth every 6 (six) hours as needed for Pain. 28 tablet 0     No current facility-administered medications for this visit.       Review of Systems   Constitutional: Negative for chills, fatigue, fever and unexpected weight change.   HENT: Negative  for hearing loss and trouble swallowing.    Eyes: Negative for photophobia and visual disturbance.   Respiratory: Negative for cough, shortness of breath and wheezing.    Cardiovascular: Negative for chest pain, palpitations and leg swelling.   Gastrointestinal: Negative for abdominal pain and nausea.   Genitourinary: Negative for dysuria and frequency.   Musculoskeletal: Positive for arthralgias, gait problem, joint swelling and myalgias. Negative for back pain.   Skin: Positive for color change. Negative for rash and wound.   Neurological: Negative for tremors, seizures, weakness, numbness and headaches.   Hematological: Does not bruise/bleed easily.         Objective:        Physical Exam:   Foot Exam    General  General Appearance: appears stated age and healthy   Orientation: alert and oriented to person, place, and time   Affect: appropriate   Gait: antalgic   Assistance: (Crutches)      Left Foot/Ankle      Inspection and Palpation  Ecchymosis: none  Tenderness: (Lateral foot overlying the cuboid)  Swelling: (Mild edema of the foot-improved from previous exam)  Arch: normal  Skin Exam: skin intact;   Neurovascular  Dorsalis pedis: 2+  Posterior tibial: 2+  Capillary refill: 2+  Varicose veins: not present  Saphenous nerve sensation: normal  Tibial nerve sensation: normal  Superficial peroneal nerve sensation: normal  Deep peroneal nerve sensation: normal  Sural nerve sensation: normal    Edema  Type of edema: non-pitting    Muscle Strength  Ankle dorsiflexion: 5  Ankle plantar flexion: 5  Ankle inversion: 5  Ankle eversion: 5  Great toe extension: 5  Great toe flexion: 5    Range of Motion    Passive  Ankle dorsiflexion: pain  Plantar flexion: pain  Ankle eversion: pain  Ankle inversion: pain    Active  Ankle dorsiflexion: pain  Plantar flexion: pain  Ankle eversion: pain  Ankle inversion: pain    Tests  Anterior drawer: negative   Talar tilt: negative   PT Tinel's sign: negative  Paresthesia:  negative        Physical Exam  Cardiovascular:      Pulses:           Dorsalis pedis pulses are 2+ on the left side.        Posterior tibial pulses are 2+ on the left side.            Assessment:       1. Closed displaced fracture of cuboid of left foot with routine healing, subsequent encounter    2. Left foot pain    3. Injury of left foot, subsequent encounter      Plan:   Closed displaced fracture of cuboid of left foot with routine healing, subsequent encounter    Left foot pain    Injury of left foot, subsequent encounter      Follow up Planned for open reduction internal fixation left cuboid next Thursday.     Procedures- None    Long discussion with patient reagarding the procedure in detail. Patient understands all risks, potential complications, and alternatives, including, but not limited to those listed on the consent form. All questions were answered. No guarantees given or implied as to outcome.  Discussed pre-op and post-op instructions. Post-op appointment made.  Informed verbal and written consent was obtained. Consent forms read, signed, witnessed.     Patient was educated about the entire pre, zeina and post-operative time period.  They  were educated about the pro's and con's of surgery.  All conservative measures have been exhausted. Patient understands the particular risks involved which may occur in connection with the procedure proposed including pain, swelling, infection, stiffness, decreased ROM, recurrence, rejection, numbness, delayed healing, scar formation.    Patient was educated that their diagnosis may be surgically treated.  The patient's problem will probably advance and usually will not get better without surgery.  All of the pre-operative treatment plans have been exhausted or will no longer be successful at this point in time.  Patient was told of the possible outcomes and expectations of the surgical procedure.  They  will need to be followed-up post-operatively.  Today,  pictures were drawn, questions answered.      We discussed the following surgical procedures:  Open reduction with internal fixation left cuboid       This note was created using Dragon voice recognition software that occasionally misinterpreted phrases or words.

## 2022-02-04 NOTE — H&P (VIEW-ONLY)
[address] [facility] [address]  [place] [address]  Phone: [phone]   [phone]      Subjective:      Chief Complaint:  Follow-up left cuboid fracture    HPI:    Britta Mariee is a 21 y.o. female who returns to the clinic today for follow-up on Closed displaced fracture of cuboid of left foot and is ready for surgical intervention. Pre-op clearance by internist. Patient states increased pain and bruising.   She has remained nonweightbearing in Cam walker boot with crutch assistance.     Vitals:    02/04/22 1111   BP: 110/68   Pulse: 84   Resp: 16     Shoe Size:    Past Surgical History:   Procedure Laterality Date    WISDOM TOOTH EXTRACTION       Past Medical History:   Diagnosis Date    Asthma      Family History   Problem Relation Age of Onset    Hypertension Sister     Breast cancer Neg Hx     Cancer Neg Hx     Colon cancer Neg Hx     Miscarriages / Stillbirths Neg Hx     Diabetes Neg Hx         Social History:   Marital Status: Single  Alcohol History:  reports no history of alcohol use.  Tobacco History:  reports that she has never smoked. She has never used smokeless tobacco.  Drug History:  reports no history of drug use.    Review of patient's allergies indicates:  No Known Allergies    Current Outpatient Medications   Medication Sig Dispense Refill    albuterol 90 mcg/actuation inhaler Inhale 1-2 puffs into the lungs every 6 (six) hours as needed for Wheezing. Rescue 1 Inhaler 0    albuterol-ipratropium 2.5mg-0.5mg/3mL (DUO-NEB) 0.5 mg-3 mg(2.5 mg base)/3 mL nebulizer solution Take 3 mLs by nebulization every 6 (six) hours as needed for Wheezing. Rescue 1 Box 0    HYDROcodone-acetaminophen (NORCO) 5-325 mg per tablet Take 1 tablet by mouth every 6 (six) hours as needed for Pain. 28 tablet 0     No current facility-administered medications for this visit.       Review of Systems   Constitutional: Negative for chills, fatigue, fever and unexpected weight change.   HENT: Negative  for hearing loss and trouble swallowing.    Eyes: Negative for photophobia and visual disturbance.   Respiratory: Negative for cough, shortness of breath and wheezing.    Cardiovascular: Negative for chest pain, palpitations and leg swelling.   Gastrointestinal: Negative for abdominal pain and nausea.   Genitourinary: Negative for dysuria and frequency.   Musculoskeletal: Positive for arthralgias, gait problem, joint swelling and myalgias. Negative for back pain.   Skin: Positive for color change. Negative for rash and wound.   Neurological: Negative for tremors, seizures, weakness, numbness and headaches.   Hematological: Does not bruise/bleed easily.         Objective:        Physical Exam:   Foot Exam    General  General Appearance: appears stated age and healthy   Orientation: alert and oriented to person, place, and time   Affect: appropriate   Gait: antalgic   Assistance: (Crutches)      Left Foot/Ankle      Inspection and Palpation  Ecchymosis: none  Tenderness: (Lateral foot overlying the cuboid)  Swelling: (Mild edema of the foot-improved from previous exam)  Arch: normal  Skin Exam: skin intact;   Neurovascular  Dorsalis pedis: 2+  Posterior tibial: 2+  Capillary refill: 2+  Varicose veins: not present  Saphenous nerve sensation: normal  Tibial nerve sensation: normal  Superficial peroneal nerve sensation: normal  Deep peroneal nerve sensation: normal  Sural nerve sensation: normal    Edema  Type of edema: non-pitting    Muscle Strength  Ankle dorsiflexion: 5  Ankle plantar flexion: 5  Ankle inversion: 5  Ankle eversion: 5  Great toe extension: 5  Great toe flexion: 5    Range of Motion    Passive  Ankle dorsiflexion: pain  Plantar flexion: pain  Ankle eversion: pain  Ankle inversion: pain    Active  Ankle dorsiflexion: pain  Plantar flexion: pain  Ankle eversion: pain  Ankle inversion: pain    Tests  Anterior drawer: negative   Talar tilt: negative   PT Tinel's sign: negative  Paresthesia:  negative        Physical Exam  Cardiovascular:      Pulses:           Dorsalis pedis pulses are 2+ on the left side.        Posterior tibial pulses are 2+ on the left side.            Assessment:       1. Closed displaced fracture of cuboid of left foot with routine healing, subsequent encounter    2. Left foot pain    3. Injury of left foot, subsequent encounter      Plan:   Closed displaced fracture of cuboid of left foot with routine healing, subsequent encounter    Left foot pain    Injury of left foot, subsequent encounter      Follow up Planned for open reduction internal fixation left cuboid next Thursday.     Procedures- None    Long discussion with patient reagarding the procedure in detail. Patient understands all risks, potential complications, and alternatives, including, but not limited to those listed on the consent form. All questions were answered. No guarantees given or implied as to outcome.  Discussed pre-op and post-op instructions. Post-op appointment made.  Informed verbal and written consent was obtained. Consent forms read, signed, witnessed.     Patient was educated about the entire pre, zeina and post-operative time period.  They  were educated about the pro's and con's of surgery.  All conservative measures have been exhausted. Patient understands the particular risks involved which may occur in connection with the procedure proposed including pain, swelling, infection, stiffness, decreased ROM, recurrence, rejection, numbness, delayed healing, scar formation.    Patient was educated that their diagnosis may be surgically treated.  The patient's problem will probably advance and usually will not get better without surgery.  All of the pre-operative treatment plans have been exhausted or will no longer be successful at this point in time.  Patient was told of the possible outcomes and expectations of the surgical procedure.  They  will need to be followed-up post-operatively.  Today,  pictures were drawn, questions answered.      We discussed the following surgical procedures:  Open reduction with internal fixation left cuboid       This note was created using Dragon voice recognition software that occasionally misinterpreted phrases or words.

## 2022-02-10 ENCOUNTER — HOSPITAL ENCOUNTER (OUTPATIENT)
Facility: HOSPITAL | Age: 22
Discharge: HOME | End: 2022-02-10
Attending: PODIATRIST | Admitting: PODIATRIST
Payer: MEDICAID

## 2022-02-10 ENCOUNTER — ANESTHESIA (OUTPATIENT)
Dept: SURGERY | Facility: HOSPITAL | Age: 22
End: 2022-02-10
Payer: MEDICAID

## 2022-02-10 ENCOUNTER — ANESTHESIA EVENT (OUTPATIENT)
Dept: SURGERY | Facility: HOSPITAL | Age: 22
End: 2022-02-10
Payer: MEDICAID

## 2022-02-10 VITALS
HEIGHT: 59 IN | SYSTOLIC BLOOD PRESSURE: 128 MMHG | TEMPERATURE: 98 F | BODY MASS INDEX: 51.56 KG/M2 | OXYGEN SATURATION: 99 % | RESPIRATION RATE: 18 BRPM | HEART RATE: 75 BPM | WEIGHT: 255.75 LBS | DIASTOLIC BLOOD PRESSURE: 67 MMHG

## 2022-02-10 DIAGNOSIS — M79.672 LEFT FOOT PAIN: ICD-10-CM

## 2022-02-10 DIAGNOSIS — R52 PAIN: ICD-10-CM

## 2022-02-10 DIAGNOSIS — S92.212A: ICD-10-CM

## 2022-02-10 DIAGNOSIS — S92.212A CLOSED DISPLACED FRACTURE OF CUBOID OF LEFT FOOT, INITIAL ENCOUNTER: ICD-10-CM

## 2022-02-10 LAB
ALBUMIN SERPL BCP-MCNC: 3.5 G/DL (ref 3.5–5.2)
ALP SERPL-CCNC: 65 U/L (ref 55–135)
ALT SERPL W/O P-5'-P-CCNC: 17 U/L (ref 10–44)
ANION GAP SERPL CALC-SCNC: 11 MMOL/L (ref 8–16)
AST SERPL-CCNC: 13 U/L (ref 10–40)
B-HCG UR QL: NEGATIVE
BASOPHILS # BLD AUTO: 0.03 K/UL (ref 0–0.2)
BASOPHILS NFR BLD: 0.5 % (ref 0–1.9)
BILIRUB SERPL-MCNC: 0.7 MG/DL (ref 0.1–1)
BILIRUB UR QL STRIP: NEGATIVE
BUN SERPL-MCNC: 11 MG/DL (ref 6–20)
CALCIUM SERPL-MCNC: 9 MG/DL (ref 8.7–10.5)
CHLORIDE SERPL-SCNC: 105 MMOL/L (ref 95–110)
CLARITY UR: CLEAR
CO2 SERPL-SCNC: 21 MMOL/L (ref 23–29)
COLOR UR: YELLOW
CREAT SERPL-MCNC: 0.7 MG/DL (ref 0.5–1.4)
CTP QC/QA: YES
DIFFERENTIAL METHOD: ABNORMAL
EOSINOPHIL # BLD AUTO: 0.1 K/UL (ref 0–0.5)
EOSINOPHIL NFR BLD: 1.2 % (ref 0–8)
ERYTHROCYTE [DISTWIDTH] IN BLOOD BY AUTOMATED COUNT: 13.9 % (ref 11.5–14.5)
EST. GFR  (AFRICAN AMERICAN): >60 ML/MIN/1.73 M^2
EST. GFR  (NON AFRICAN AMERICAN): >60 ML/MIN/1.73 M^2
GLUCOSE SERPL-MCNC: 94 MG/DL (ref 70–110)
GLUCOSE UR QL STRIP: NEGATIVE
HCT VFR BLD AUTO: 37.5 % (ref 37–48.5)
HGB BLD-MCNC: 12.1 G/DL (ref 12–16)
HGB UR QL STRIP: NEGATIVE
IMM GRANULOCYTES # BLD AUTO: 0.01 K/UL (ref 0–0.04)
IMM GRANULOCYTES NFR BLD AUTO: 0.2 % (ref 0–0.5)
KETONES UR QL STRIP: NEGATIVE
LEUKOCYTE ESTERASE UR QL STRIP: NEGATIVE
LYMPHOCYTES # BLD AUTO: 1.9 K/UL (ref 1–4.8)
LYMPHOCYTES NFR BLD: 32.6 % (ref 18–48)
MCH RBC QN AUTO: 27.8 PG (ref 27–31)
MCHC RBC AUTO-ENTMCNC: 32.3 G/DL (ref 32–36)
MCV RBC AUTO: 86 FL (ref 82–98)
MONOCYTES # BLD AUTO: 0.5 K/UL (ref 0.3–1)
MONOCYTES NFR BLD: 8.2 % (ref 4–15)
NEUTROPHILS # BLD AUTO: 3.4 K/UL (ref 1.8–7.7)
NEUTROPHILS NFR BLD: 57.3 % (ref 38–73)
NITRITE UR QL STRIP: NEGATIVE
NRBC BLD-RTO: 0 /100 WBC
PH UR STRIP: 6 [PH] (ref 5–8)
PLATELET # BLD AUTO: 354 K/UL (ref 150–450)
PMV BLD AUTO: 9 FL (ref 9.2–12.9)
POTASSIUM SERPL-SCNC: 3.5 MMOL/L (ref 3.5–5.1)
PROT SERPL-MCNC: 6.6 G/DL (ref 6–8.4)
PROT UR QL STRIP: NEGATIVE
RBC # BLD AUTO: 4.36 M/UL (ref 4–5.4)
SARS-COV-2 RDRP RESP QL NAA+PROBE: NEGATIVE
SODIUM SERPL-SCNC: 137 MMOL/L (ref 136–145)
SP GR UR STRIP: 1.03 (ref 1–1.03)
URN SPEC COLLECT METH UR: NORMAL
UROBILINOGEN UR STRIP-ACNC: NEGATIVE EU/DL
WBC # BLD AUTO: 5.86 K/UL (ref 3.9–12.7)

## 2022-02-10 PROCEDURE — 28465 PR OPEN TX TARSAL FRACTURE XCP TALUS &CALCANEUS EA: ICD-10-PCS | Mod: LT,,, | Performed by: PODIATRIST

## 2022-02-10 PROCEDURE — 81003 URINALYSIS AUTO W/O SCOPE: CPT | Performed by: PODIATRIST

## 2022-02-10 PROCEDURE — 63600175 PHARM REV CODE 636 W HCPCS: Performed by: ANESTHESIOLOGY

## 2022-02-10 PROCEDURE — 63600175 PHARM REV CODE 636 W HCPCS: Performed by: NURSE ANESTHETIST, CERTIFIED REGISTERED

## 2022-02-10 PROCEDURE — 27201423 OPTIME MED/SURG SUP & DEVICES STERILE SUPPLY: Performed by: PODIATRIST

## 2022-02-10 PROCEDURE — 37000008 HC ANESTHESIA 1ST 15 MINUTES: Performed by: PODIATRIST

## 2022-02-10 PROCEDURE — 28465 OPTX TARSAL BONE FX EACH: CPT | Mod: LT,,, | Performed by: PODIATRIST

## 2022-02-10 PROCEDURE — 71000033 HC RECOVERY, INTIAL HOUR: Performed by: PODIATRIST

## 2022-02-10 PROCEDURE — 63600175 PHARM REV CODE 636 W HCPCS: Performed by: PODIATRIST

## 2022-02-10 PROCEDURE — U0002 COVID-19 LAB TEST NON-CDC: HCPCS | Performed by: PODIATRIST

## 2022-02-10 PROCEDURE — 36000709 HC OR TIME LEV III EA ADD 15 MIN: Performed by: PODIATRIST

## 2022-02-10 PROCEDURE — 27000080 OPTIME MED/SURG SUP & DEVICES GENERAL CLASSIFICATION: Performed by: PODIATRIST

## 2022-02-10 PROCEDURE — C1713 ANCHOR/SCREW BN/BN,TIS/BN: HCPCS | Performed by: PODIATRIST

## 2022-02-10 PROCEDURE — 71000039 HC RECOVERY, EACH ADD'L HOUR: Performed by: PODIATRIST

## 2022-02-10 PROCEDURE — 80053 COMPREHEN METABOLIC PANEL: CPT | Performed by: PODIATRIST

## 2022-02-10 PROCEDURE — 25000003 PHARM REV CODE 250: Performed by: PODIATRIST

## 2022-02-10 PROCEDURE — 01480 ANES OPEN PX LOWER L/A/F NOS: CPT | Performed by: PODIATRIST

## 2022-02-10 PROCEDURE — C1769 GUIDE WIRE: HCPCS | Performed by: PODIATRIST

## 2022-02-10 PROCEDURE — 25000003 PHARM REV CODE 250: Performed by: NURSE ANESTHETIST, CERTIFIED REGISTERED

## 2022-02-10 PROCEDURE — 81025 URINE PREGNANCY TEST: CPT | Performed by: PODIATRIST

## 2022-02-10 PROCEDURE — 85025 COMPLETE CBC W/AUTO DIFF WBC: CPT | Performed by: PODIATRIST

## 2022-02-10 PROCEDURE — C9290 INJ, BUPIVACAINE LIPOSOME: HCPCS | Performed by: PODIATRIST

## 2022-02-10 PROCEDURE — 71000015 HC POSTOP RECOV 1ST HR: Performed by: PODIATRIST

## 2022-02-10 PROCEDURE — 37000009 HC ANESTHESIA EA ADD 15 MINS: Performed by: PODIATRIST

## 2022-02-10 PROCEDURE — 36000708 HC OR TIME LEV III 1ST 15 MIN: Performed by: PODIATRIST

## 2022-02-10 DEVICE — IMPLANTABLE DEVICE: Type: IMPLANTABLE DEVICE | Site: FOOT | Status: FUNCTIONAL

## 2022-02-10 RX ORDER — DIPHENHYDRAMINE HYDROCHLORIDE 50 MG/ML
12.5 INJECTION INTRAMUSCULAR; INTRAVENOUS
Status: DISCONTINUED | OUTPATIENT
Start: 2022-02-10 | End: 2022-02-10 | Stop reason: HOSPADM

## 2022-02-10 RX ORDER — ONDANSETRON 2 MG/ML
4 INJECTION INTRAMUSCULAR; INTRAVENOUS DAILY PRN
Status: DISCONTINUED | OUTPATIENT
Start: 2022-02-10 | End: 2022-02-10 | Stop reason: HOSPADM

## 2022-02-10 RX ORDER — SUCCINYLCHOLINE CHLORIDE 20 MG/ML
INJECTION INTRAMUSCULAR; INTRAVENOUS
Status: DISCONTINUED | OUTPATIENT
Start: 2022-02-10 | End: 2022-02-10

## 2022-02-10 RX ORDER — HYDROMORPHONE HYDROCHLORIDE 1 MG/ML
0.2 INJECTION, SOLUTION INTRAMUSCULAR; INTRAVENOUS; SUBCUTANEOUS EVERY 5 MIN PRN
Status: DISCONTINUED | OUTPATIENT
Start: 2022-02-10 | End: 2022-02-10 | Stop reason: HOSPADM

## 2022-02-10 RX ORDER — ONDANSETRON 4 MG/1
8 TABLET, ORALLY DISINTEGRATING ORAL EVERY 8 HOURS PRN
Qty: 30 TABLET | Refills: 0 | Status: SHIPPED | OUTPATIENT
Start: 2022-02-10 | End: 2023-04-27 | Stop reason: ALTCHOICE

## 2022-02-10 RX ORDER — OXYCODONE HYDROCHLORIDE 5 MG/1
5 TABLET ORAL
Status: DISCONTINUED | OUTPATIENT
Start: 2022-02-10 | End: 2022-02-10 | Stop reason: HOSPADM

## 2022-02-10 RX ORDER — HYDROCODONE BITARTRATE AND ACETAMINOPHEN 10; 325 MG/1; MG/1
1 TABLET ORAL EVERY 6 HOURS PRN
Qty: 30 TABLET | Refills: 0 | Status: SHIPPED | OUTPATIENT
Start: 2022-02-10 | End: 2022-02-15

## 2022-02-10 RX ORDER — ACETAMINOPHEN 10 MG/ML
INJECTION, SOLUTION INTRAVENOUS
Status: DISCONTINUED | OUTPATIENT
Start: 2022-02-10 | End: 2022-02-10

## 2022-02-10 RX ORDER — ROCURONIUM BROMIDE 10 MG/ML
INJECTION, SOLUTION INTRAVENOUS
Status: DISCONTINUED | OUTPATIENT
Start: 2022-02-10 | End: 2022-02-10

## 2022-02-10 RX ORDER — PROMETHAZINE HYDROCHLORIDE 25 MG/1
25 TABLET ORAL EVERY 6 HOURS PRN
Status: DISCONTINUED | OUTPATIENT
Start: 2022-02-10 | End: 2022-02-10 | Stop reason: HOSPADM

## 2022-02-10 RX ORDER — HYDROMORPHONE HYDROCHLORIDE 1 MG/ML
0.2 INJECTION, SOLUTION INTRAMUSCULAR; INTRAVENOUS; SUBCUTANEOUS EVERY 5 MIN PRN
Status: COMPLETED | OUTPATIENT
Start: 2022-02-10 | End: 2022-02-10

## 2022-02-10 RX ORDER — DEXAMETHASONE SODIUM PHOSPHATE 4 MG/ML
INJECTION, SOLUTION INTRA-ARTICULAR; INTRALESIONAL; INTRAMUSCULAR; INTRAVENOUS; SOFT TISSUE
Status: DISCONTINUED | OUTPATIENT
Start: 2022-02-10 | End: 2022-02-10

## 2022-02-10 RX ORDER — MEPERIDINE HYDROCHLORIDE 50 MG/ML
12.5 INJECTION INTRAMUSCULAR; INTRAVENOUS; SUBCUTANEOUS EVERY 10 MIN PRN
Status: DISCONTINUED | OUTPATIENT
Start: 2022-02-10 | End: 2022-02-10 | Stop reason: HOSPADM

## 2022-02-10 RX ORDER — ONDANSETRON 4 MG/1
8 TABLET, ORALLY DISINTEGRATING ORAL EVERY 8 HOURS PRN
Status: DISCONTINUED | OUTPATIENT
Start: 2022-02-10 | End: 2022-02-10 | Stop reason: HOSPADM

## 2022-02-10 RX ORDER — SODIUM CHLORIDE 0.9 % (FLUSH) 0.9 %
10 SYRINGE (ML) INJECTION
Status: DISCONTINUED | OUTPATIENT
Start: 2022-02-10 | End: 2022-02-10 | Stop reason: HOSPADM

## 2022-02-10 RX ORDER — BUPIVACAINE HYDROCHLORIDE 5 MG/ML
INJECTION, SOLUTION EPIDURAL; INTRACAUDAL
Status: DISCONTINUED | OUTPATIENT
Start: 2022-02-10 | End: 2022-02-10 | Stop reason: HOSPADM

## 2022-02-10 RX ORDER — PROPOFOL 10 MG/ML
VIAL (ML) INTRAVENOUS
Status: DISCONTINUED | OUTPATIENT
Start: 2022-02-10 | End: 2022-02-10

## 2022-02-10 RX ORDER — CEPHALEXIN 500 MG/1
500 CAPSULE ORAL EVERY 12 HOURS
Qty: 14 CAPSULE | Refills: 0 | Status: SHIPPED | OUTPATIENT
Start: 2022-02-10 | End: 2022-02-17

## 2022-02-10 RX ORDER — HYDROCODONE BITARTRATE AND ACETAMINOPHEN 10; 325 MG/1; MG/1
1 TABLET ORAL EVERY 4 HOURS PRN
Status: DISCONTINUED | OUTPATIENT
Start: 2022-02-10 | End: 2022-02-10 | Stop reason: HOSPADM

## 2022-02-10 RX ORDER — HYDROCODONE BITARTRATE AND ACETAMINOPHEN 5; 325 MG/1; MG/1
1 TABLET ORAL EVERY 4 HOURS PRN
Status: DISCONTINUED | OUTPATIENT
Start: 2022-02-10 | End: 2022-02-10 | Stop reason: HOSPADM

## 2022-02-10 RX ORDER — LIDOCAINE HCL/PF 100 MG/5ML
SYRINGE (ML) INTRAVENOUS
Status: DISCONTINUED | OUTPATIENT
Start: 2022-02-10 | End: 2022-02-10

## 2022-02-10 RX ORDER — FENTANYL CITRATE 50 UG/ML
INJECTION, SOLUTION INTRAMUSCULAR; INTRAVENOUS
Status: DISCONTINUED | OUTPATIENT
Start: 2022-02-10 | End: 2022-02-10

## 2022-02-10 RX ORDER — MIDAZOLAM HYDROCHLORIDE 5 MG/ML
INJECTION INTRAMUSCULAR; INTRAVENOUS
Status: DISCONTINUED | OUTPATIENT
Start: 2022-02-10 | End: 2022-02-10

## 2022-02-10 RX ORDER — ONDANSETRON 2 MG/ML
INJECTION INTRAMUSCULAR; INTRAVENOUS
Status: DISCONTINUED | OUTPATIENT
Start: 2022-02-10 | End: 2022-02-10

## 2022-02-10 RX ADMIN — MIDAZOLAM HYDROCHLORIDE 2 MG: 5 INJECTION, SOLUTION INTRAMUSCULAR; INTRAVENOUS at 09:02

## 2022-02-10 RX ADMIN — HYDROMORPHONE HYDROCHLORIDE 0.2 MG: 1 INJECTION, SOLUTION INTRAMUSCULAR; INTRAVENOUS; SUBCUTANEOUS at 11:02

## 2022-02-10 RX ADMIN — HYDROMORPHONE HYDROCHLORIDE 0.2 MG: 1 INJECTION, SOLUTION INTRAMUSCULAR; INTRAVENOUS; SUBCUTANEOUS at 10:02

## 2022-02-10 RX ADMIN — DEXAMETHASONE SODIUM PHOSPHATE 8 MG: 4 INJECTION, SOLUTION INTRAMUSCULAR; INTRAVENOUS at 09:02

## 2022-02-10 RX ADMIN — ROCURONIUM BROMIDE 5 MG: 10 INJECTION, SOLUTION INTRAVENOUS at 09:02

## 2022-02-10 RX ADMIN — LIDOCAINE HYDROCHLORIDE 60 MG: 20 INJECTION, SOLUTION INTRAVENOUS at 09:02

## 2022-02-10 RX ADMIN — FENTANYL CITRATE 100 MCG: 50 INJECTION INTRAMUSCULAR; INTRAVENOUS at 09:02

## 2022-02-10 RX ADMIN — PROPOFOL 180 MG: 10 INJECTION, EMULSION INTRAVENOUS at 09:02

## 2022-02-10 RX ADMIN — ACETAMINOPHEN 1000 MG: 10 INJECTION, SOLUTION INTRAVENOUS at 09:02

## 2022-02-10 RX ADMIN — SUCCINYLCHOLINE CHLORIDE 120 MG: 20 INJECTION, SOLUTION INTRAMUSCULAR; INTRAVENOUS at 09:02

## 2022-02-10 RX ADMIN — SODIUM CHLORIDE, SODIUM LACTATE, POTASSIUM CHLORIDE, AND CALCIUM CHLORIDE: .6; .31; .03; .02 INJECTION, SOLUTION INTRAVENOUS at 09:02

## 2022-02-10 RX ADMIN — ONDANSETRON 4 MG: 2 INJECTION INTRAMUSCULAR; INTRAVENOUS at 09:02

## 2022-02-10 NOTE — TRANSFER OF CARE
"Anesthesia Transfer of Care Note    Patient: Britta Mariee    Procedure(s) Performed: Procedure(s) (LRB):  ORIF LEFT CUBOID (Left)    Patient location: PACU    Anesthesia Type: general    Transport from OR: Transported from OR on room air with adequate spontaneous ventilation    Post pain: adequate analgesia    Post assessment: no apparent anesthetic complications    Post vital signs: stable    Level of consciousness: awake and alert    Nausea/Vomiting: no nausea/vomiting    Complications: none    Transfer of care protocol was followed      Last vitals:   Visit Vitals  /89   Pulse 83   Temp 36.8 °C (98.2 °F) (Oral)   Resp 17   Ht 4' 11" (1.499 m)   Wt 116 kg (255 lb 11.7 oz)   LMP 01/12/2022   SpO2 100%   Breastfeeding No   BMI 51.65 kg/m²     "

## 2022-02-10 NOTE — PLAN OF CARE
1155- pt is alert and oriented breathing even unlabored. Pt has no complaints of pain at this time. Pt is able to move toes on right foot with no difficulty.

## 2022-02-10 NOTE — ANESTHESIA PREPROCEDURE EVALUATION
02/10/2022  Britta Mariee is a 21 y.o., female.      There is no problem list on file for this patient.      Past Surgical History:   Procedure Laterality Date    WISDOM TOOTH EXTRACTION          Tobacco Use:  The patient  reports that she has never smoked. She has never used smokeless tobacco.         No results found for: WBC, HGB, HCT, MCV, PLT  BMP  No results found for: NA, K, CL, CO2, BUN, CREATININE, CALCIUM, ANIONGAP, GLU    No results found for this or any previous visit.    UPT  - Negative 2/10/22        Anesthesia Evaluation    I have reviewed the Patient Summary Reports.    I have reviewed the Nursing Notes. I have reviewed the NPO Status.   I have reviewed the Medications.     Review of Systems  Anesthesia Hx:  No problems with previous Anesthesia  Denies Family Hx of Anesthesia complications.   Denies Personal Hx of Anesthesia complications.   Social:  Non-Smoker    Hematology/Oncology:  Hematology Normal   Oncology Normal     EENT/Dental:  EENT/Dental Normal Right upper 2nd premolar broken   Cardiovascular:  Cardiovascular Normal     Pulmonary:   Asthma (No problems for the past 5-10 years)    Renal/:  Renal/ Normal     Hepatic/GI:   GERD ( rarely, none today)    Musculoskeletal:  Musculoskeletal Normal    Neurological:  Neurology Normal    Endocrine:  Endocrine Normal    Psych:  Psychiatric Normal           Physical Exam  General:  Morbid Obesity    Airway/Jaw/Neck:  Airway Findings: Mouth Opening: Normal Tongue: Normal  General Airway Assessment: Adult  Mallampati: II  Improves to I with phonation.  TM Distance: Normal, at least 6 cm  Jaw/Neck Findings:  Neck ROM: Normal ROM       Chest/Lungs:  Chest/Lungs Findings: Clear to auscultation, Normal Respiratory Rate     Heart/Vascular:  Heart Findings: Rate: Normal  Rhythm: Regular Rhythm  Sounds: Normal  Heart murmur: negative        Mental Status:  Mental Status Findings:  Cooperative, Alert and Oriented         Anesthesia Plan  Type of Anesthesia, risks & benefits discussed:  Anesthesia Type:  general    Patient's Preference:   Plan Factors:          Intra-op Monitoring Plan: standard ASA monitors  Intra-op Monitoring Plan Comments:   Post Op Pain Control Plan: multimodal analgesia  Post Op Pain Control Plan Comments:     Induction:   IV  Beta Blocker:  Patient is not currently on a Beta-Blocker (No further documentation required).       Informed Consent: Patient understands risks and agrees with Anesthesia plan.  Questions answered. Anesthesia consent signed with patient.  ASA Score: 3     Day of Surgery Review of History & Physical: I have interviewed and examined the patient. I have reviewed the patient's H&P dated:  There are no significant changes.      Anesthesia Plan Notes: LMA  IV acetaminophen, Decadron 8 mg, Zofran, Pepcid          Ready For Surgery From Anesthesia Perspective.

## 2022-02-10 NOTE — PLAN OF CARE
1235- pt is alert and oriented breathing even unlabored with no complaints of pain at this time. Pt wheeled to her vehicle with no incident. pts mother transporting her home.

## 2022-02-10 NOTE — OP NOTE
Operative Report     Patient name: Britta Mariee   MRN: [id number]  Date of surgery: 2/10/2022    Surgeon: Avel Ruiz DPM   Assistant:  None    Preoperative diagnosis:  Closed displaced fracture left cuboid  Postoperative diagnosis:  Same as above  Procedure:  Open reduction with internal fixation left cuboid  Anesthesia:  General  Hemostasis:  Pneumatic ankle tourniquet at 250 mmHg  Estimated blood loss:  2 mL   Specimen:  None  Complications: None  Condition upon discharge: Stable    Procedure in detail:  Patient is brought the operating room placed the operating table in a supine position.  Following induction general anesthesia a well-padded pneumatic ankle tourniquet was placed around the patient's left ankle and set at 250 mmHg.  The left foot was then prepped scrubbed and draped in normal aseptic manner.  A time-out was then called.  An Esmarch bandage was utilized to exsanguinate the left foot and pneumatic ankle tourniquet was inflated to 250 mmHg.  At this time utilizing 15. Blade a linear longitudinal incision was made overlying the dorsal lateral aspect of the cuboid bone.  Dissection was then carried bluntly through the subcutaneous tissues ensuring to retract all vital neurovascular structures.  Bleeders cauterized as necessary.  Sural nerve was identified and retracted inferiorly.  The extensor digitorum brevis muscle belly was reflected superiorly off of the cuboid.  Once the muscle belly was resected the cuboid was visualized.  There was noted to be several fracture lines with the main fracture extending longitudinal into the bone with a small amount of gapping present.  At this time the fracture was reduced with a bone clamp.  Two guidewires were then placed lateral to medial through the cuboid.  Placed in length tension wire was assessed using intraoperative fluoroscopy.  The cuboid fracture was then fixated with two 3-0 headless cannulated screws.  There was noted to be good reduction of  the displaced fracture portion with good length and alignment of the screws.  The guidewires were then removed.  The wound was flushed with copious amounts of sterile saline.  The ED muscle belly was closed utilizing 2 Vicryl.  Subcutaneous tissues were closed utilizing 3-0 Vicryl.  Skin was closed utilizing 4-0 Vicryl in a running subcuticular fashion.  20 cc of a one-to-one mixture of 0.5% Marcaine plain and Exparel was utilized a block of the surgical site.  The foot was then dressed with Mastisol Steri-Strips 4x4s Kerlix and Ace wrap.  The pneumatic ankle tourniquet was deflated neurovascular status noted be intact to the left lower extremity.  Patient's left foot was placed in a postoperative Cam Walker boot.  Patient tolerated the procedure well.  She had anesthesia reversed and left the operating Room stable vitals.    1. Keep dressings, clean, dry, and intact to surgical extremity.  2. Rest, ice, and elevate the surgical extremity.  3. Nonweightbearing to surgical extremity in Cam Walker boot  4. Take all medication as discussed at discharge.  5. Contact the clinic with any postoperative concerns or complications.  6. Follow up in one week for 1st post op.

## 2022-02-10 NOTE — INTERVAL H&P NOTE
The patient has been examined and the H&P has been reviewed:    I concur with the findings and no changes have occurred since H&P was written.    Anesthesia risks, benefits and alternative options discussed and understood by patient/family.          There are no hospital problems to display for this patient.    
Skin normal color for race, warm, dry and intact. No evidence of rash.

## 2022-02-10 NOTE — ANESTHESIA PROCEDURE NOTES
Intubation    Date/Time: 2/10/2022 9:17 AM  Performed by: Teresa Coleman CRNA  Authorized by: Ellis Santa MD     Intubation:     Induction:  Intravenous    Intubated:  Postinduction    Mask Ventilation:  Easy mask    Attempts:  1    Attempted By:  CRNA    Laryngeal View Grade: Grade I - full view of cords      Difficult Airway Encountered?: No      Complications:  None    Airway Device Size:  7.5    Style/Cuff Inflation:  Cuffed    Tube secured:  22

## 2022-02-10 NOTE — ANESTHESIA POSTPROCEDURE EVALUATION
Anesthesia Post Evaluation    Patient: Britta Mariee    Procedure(s) Performed: Procedure(s) (LRB):  ORIF, FOOT (Left)    Final Anesthesia Type: general      Patient location during evaluation: PACU  Patient participation: Yes- Able to Participate  Level of consciousness: awake and alert and oriented  Post-procedure vital signs: reviewed and stable  Pain management: adequate  Airway patency: patent    PONV status at discharge: No PONV  Anesthetic complications: no      Cardiovascular status: blood pressure returned to baseline and hemodynamically stable  Respiratory status: unassisted, spontaneous ventilation and room air  Hydration status: euvolemic  Follow-up not needed.          Vitals Value Taken Time   /57 02/10/22 1145   Temp 36.4 °C (97.6 °F) 02/10/22 1100   Pulse 81 02/10/22 1146   Resp 25 02/10/22 1146   SpO2 97 % 02/10/22 1146   Vitals shown include unvalidated device data.      No case tracking events are documented in the log.      Pain/Prabhu Score: Pain Rating Prior to Med Admin: 5 (2/10/2022 11:45 AM)  Prabhu Score: 9 (2/10/2022 11:00 AM)

## 2022-02-15 ENCOUNTER — OFFICE VISIT (OUTPATIENT)
Dept: PODIATRY | Facility: CLINIC | Age: 22
End: 2022-02-15
Payer: MEDICAID

## 2022-02-15 VITALS — WEIGHT: 255 LBS | RESPIRATION RATE: 16 BRPM | HEIGHT: 59 IN | BODY MASS INDEX: 51.41 KG/M2

## 2022-02-15 DIAGNOSIS — S92.212D CLOSED DISPLACED FRACTURE OF CUBOID OF LEFT FOOT WITH ROUTINE HEALING, SUBSEQUENT ENCOUNTER: Primary | ICD-10-CM

## 2022-02-15 DIAGNOSIS — M79.672 LEFT FOOT PAIN: ICD-10-CM

## 2022-02-15 DIAGNOSIS — S99.922D INJURY OF LEFT FOOT, SUBSEQUENT ENCOUNTER: ICD-10-CM

## 2022-02-15 PROCEDURE — 99024 POSTOP FOLLOW-UP VISIT: CPT | Mod: S$GLB,,, | Performed by: PODIATRIST

## 2022-02-15 PROCEDURE — 1160F RVW MEDS BY RX/DR IN RCRD: CPT | Mod: CPTII,S$GLB,, | Performed by: PODIATRIST

## 2022-02-15 PROCEDURE — 3008F BODY MASS INDEX DOCD: CPT | Mod: CPTII,S$GLB,, | Performed by: PODIATRIST

## 2022-02-15 PROCEDURE — 1159F MED LIST DOCD IN RCRD: CPT | Mod: CPTII,S$GLB,, | Performed by: PODIATRIST

## 2022-02-15 PROCEDURE — 1159F PR MEDICATION LIST DOCUMENTED IN MEDICAL RECORD: ICD-10-PCS | Mod: CPTII,S$GLB,, | Performed by: PODIATRIST

## 2022-02-15 PROCEDURE — 3008F PR BODY MASS INDEX (BMI) DOCUMENTED: ICD-10-PCS | Mod: CPTII,S$GLB,, | Performed by: PODIATRIST

## 2022-02-15 PROCEDURE — 99024 PR POST-OP FOLLOW-UP VISIT: ICD-10-PCS | Mod: S$GLB,,, | Performed by: PODIATRIST

## 2022-02-15 PROCEDURE — 1160F PR REVIEW ALL MEDS BY PRESCRIBER/CLIN PHARMACIST DOCUMENTED: ICD-10-PCS | Mod: CPTII,S$GLB,, | Performed by: PODIATRIST

## 2022-02-15 RX ORDER — HYDROCODONE BITARTRATE AND ACETAMINOPHEN 10; 325 MG/1; MG/1
1 TABLET ORAL EVERY 6 HOURS PRN
Qty: 30 TABLET | Refills: 0 | Status: SHIPPED | OUTPATIENT
Start: 2022-02-15 | End: 2022-02-22

## 2022-02-15 NOTE — PATIENT INSTRUCTIONS
Foot Fractures and Dislocations    Approximately 10% of all fractures occur in the bones of the foot. These bones include:   Hindfoot: the calcaneus and the talus.   Midfoot: the navicular, the cuboid and 3 cuneiforms.   Forefoot: 5 metatarsals and 14 phalanges.   The foot also contains sesamoid bones (bones embedded within a tendon).    Severe injuries to the foot can result in significant long-term pain and loss of function. Multiple fractures or dislocations of the feet are often initially overlooked in cases of multiple, severe trauma. Outcomes are worse if treatment is not immediately initiated, if soft-tissue coverage is delayed, if patients subsequently had neuritis or reflex sympathetic dystrophy, or if patients were involved in ongoing related litigation.  Stress fractures are common in athletes, and may occur in every bone of the foot and ankle, except the smaller toes.    Initial management includes ice, immobilization and elevation. Any delay in providing adequate specific treatment increases the risk of post-traumatic osteoarthritis. Other potential complications include non-union, avascular necrosis, compartment syndromes, vascular injuries, post-traumatic ankle deformities and tarsal tunnel syndrome.      Investigations  X-rays   The [place] foot rules help to predict significant midfoot fractures. A BMJ review of sensitivity of the rules as a predictor suggested that the tool has an almost 100% sensitivity in adults and children.   The [place] rules suggest that X-rays are required if any of the following are present   Point tenderness over the base of the fifth metatarsal.   Point tenderness over the navicular bone.   Inability to take four steps, both immediately after injury and when seen for assessment.   A separate review in 2003 suggested that the rules may have a lower sensitivity (93%) when applied to children, but with a reassuringly high negative predictive value of  95%.      Scans  Bone scans, CT scans, MRI and ultrasound may help to diagnose certain foot fractures that are not seen on plain X-rays.      Talar injuries   Falls on to the feet or violent dorsiflexion of the ankle (eg, against car pedals in a car accident) may cause fractures to the anterior body or articular dome of the talus.   Talar fracture is the second most common fracture of the tarsal bones.   Talar injuries are not easily diagnosed and can create significant long-term disability when missed. CT scanning is extremely helpful in diagnosing and treating them.   Displaced fractures require open reduction and internal fixation.   Prolonged non-weight-bearing and immobilization are needed.   Avascular necrosis and post-traumatic arthritis to the subtalar and tibiotalar joints are unfortunately frequent complications.      Neck and body fracture  This is the most common type of talar fracture:   It may be associated with subtalar dislocation.   Non-displaced fractures are treated with a non-weight-bearing short leg cast.   Displaced fractures usually require surgical fixation.      Lateral process fracture   Increasingly common because of snowboarding injuries.   Treatment includes immobilization with avoidance of weight-bearing.      Posterior process (Zamora's) fracture   Caused by damage to the posterior process of the talus, usually as a result of sudden plantar flexion or repetitive motion, especially dancing or kicking.   Clinical examination is usually nonspecific and plain X-rays normal.   Treatment includes immobilization with either partial or full weight-bearing.      Transchondral/osteochondral talar dome fracture   Rare; often presents as a non-healing ankle sprain. There is tenderness of the talar dome with the foot in dorsiflexion.   May be clinically indistinguishable from an ankle sprain and plain X-rays may be normal. A bone scan may be required.   Delayed presentation may  include crepitus, joint locking and laxity of lateral and anterior ankle ligaments.   Initial management involves immobilization without weight-bearing.      Dislocation of the talus   Rare; usually results from very high-energy trauma.   Peritalar and subtalar dislocations involve the articulation between the talus and calcaneum. Midtarsal dislocations involve the midtarsal joint (between the calcaneum and talus posteriorly and the navicular and cuboid anteriorly).   The dislocation is often open and results in avascular necrosis of the talus and arthritis; post-injury joint infection is the single most important factor leading to poor outcomes.   Open reduction and internal fixation are required.      Calcaneal fractures   Most calcaneal fractures follow a fall from height directly on to the heels. Calcaneal fractures are often bilateral.   Optimal management of calcaneal fractures is controversial, as correlation between anatomical restoration and outcome is not proven, and complications after surgery are frequent.   Falls from a height usually result in multiple associated injuries - eg, lumbar compression fractures, forearm fractures, and ankle, femur, and elbow fractures. There should also be a high index of suspicion for thoracic aortic rupture and renal vascular pedicle disruption.   Calcaneal fractures are divided into intra-articular and extra-articular fractures on the basis of subtalar joint involvement:      Intra-articular joint depression fractures  These are the most common type of calcaneal fracture:   Lateral foot X-rays show breaks in the cortices, trabeculae or signs of compression (reduction in Böhler's angle). Böhler's angle is the posterior angle formed by intersection of a line from the posterior to the middle facet and a line from the anterior to the middle facet; Böhler's angle is normally between 20° and 40°. Angles less than 20°, or more than 5° smaller than that of the  uninjured side, indicate a fracture.   Open reduction and internal fixation is usually necessary.      Extra-articular calcaneal fractures   Extra-articular fractures account for 30% of all calcaneal fractures in adults.   Initial management includes a compression dressing, rest, ice, and elevation, with orthopaedic follow-up.      Navicular injuries   Navicular fractures are rare. They are most often stress fractures, occurring in young athletes   They usually heal well with immobilization and weight-bearing as tolerated.   Displaced fractures involving the navicular body have a high incidence of avascular necrosis and require open reduction and internal fixation.   Complete dislocation of the navicular is rare and prompt reduction under general anaesthetic is required. It may require open reduction and arthrodesis      Fractures at the Lisfranc (tarsometatarsal) joint   The Lisfranc joint is the area of articulation between the midfoot and forefoot: it is therefore composed of the five tarsometatarsal joints. Foot fractures and traumatic ligament injuries can result in deformity, instability, pain and degenerative disease of the Lisfranc joint   Although injuries to the Lisfranc ligament complex have been associated with high-energy trauma (eg, motor vehicle collisions), they can also result from low-energy trauma, including leisure activities or athletic activity   Tarsometatarsal dislocation can be easily missed on standard foot X-rays. It can result in post-traumatic arthritis and reflex sympathetic dystrophy. To facilitate diagnosis, grasp the first and second metatarsals and move them alternately through plantar flexion and dorsiflexion.   CT imaging is useful if clinical suspicion is high, as plain X-rays can appear normal.   Fractures at the Lisfranc joint generally require urgent open reduction and fixation.      Metatarsal fractures   Metatarsal fractures are relatively common.   If  malunited they lead to pain and disability.   Multiple metatarsal fractures may be caused by direct trauma (eg, a heavy object falling on to the foot) or crush injuries (eg, a vehicle wheel).   Management includes analgesia, support in a plaster of Marnie (POP) backslab, manipulation under anaesthesia, K-wire fixation or, occasionally, open reduction and internal fixation. It is important to check and monitor the dorsalis pedis pulse.   Non-displaced fractures and fractures of the second to fourth metatarsal with displacement in the horizontal plane can be treated conservatively with protected weight-bearing in a cast shoe for 4-6 weeks.   In most displaced fractures internal fixation is needed.   Percutaneous pinning is suitable for most fractures of the metatarsals. Fractures with joint involvement and multiple fragments frequently require open reduction and plate fixation.   The metatarsals are the most common site of stress fractures.      First metatarsal fracture   The least commonly fractured metatarsal.   Minimally displaced or non-displaced fractures: management usually involves immobilization without weight-bearing. Displaced fractures usually require open reduction and internal fixation.      Second, third and fourth metatarsals   Fractures are very common.   Non-displaced and displaced fractures usually heal well, with weight-bearing as tolerated in a cast, rigid orthopaedic shoe or elastic support bandages.   Disruptions of the Lisfranc joint must be excluded.      Fifth metatarsal fractures  The proximal fifth metatarsal is the most common site of midfoot fracture. Fractures are generally of two types:      Proximal avulsion fracture   Fractures at the proximal tuberosity are very common and termed pseudo-Jaimes or tennis fractures (mid-shaft and distal fractures are much less common). They are usually associated with a lateral ankle strain and often follow inversion injuries of the  ankle.   This area should always be checked in patients with ankle injuries and foot X-rays requested if tender.   Accessory bones or the apophysis (runs parallel to the fifth metatarsal base) may cause confusion when interpreting X-rays.   Treatment includes analgesia, elevation and support in a padded crepe bandage, or a below-knee POP if symptoms are severe.   Usually heals well with a compression dressing and weight-bearing as tolerated.   May require open reduction and tension-band wiring or screw fixation if displaced more than 2 mm or with more that 30% of the joint involved.      Jaimes fracture   Less common; this is a transverse fracture at the metaphyseal-diaphyseal junction of the fifth metatarsal. Treatment involves an individualized approach tailored to the level of activity and time to union.   Displacement tends to increase with continued weight-bearing.   Initial therapy includes analgesia and immobilization without weight-bearing.   Frequently requires surgical intervention   Prone to non-union, requiring bone grafting and internal fixation.      Metatarsal stress fractures   A stress fracture is a partial or complete fracture caused by repeated application of stress lower than the stress required to fracture the bone in a single loading.   The most common site is the second metatarsal shaft, but the third metatarsal or, more rarely, other metatarsals may be affected.   May present with swelling over the forefoot and localized tenderness over the metatarsal shaft. Longitudinal compression of the metatarsal shaft (pressing on the metatarsal head below the toe) causes pain along the metatarsal shaft.   Otherwise healthy athletes, especially runners, sustain stress injuries or fractures.   They are not considered fragility fractures, although osteoporosis is a predisposing factor.   Stress fractures account for 0.7% to 20% of all sports medicine clinic injuries.   Track-and-field  athletes have the highest incidence of stress fractures compared with other athletes: The sites of stress fractures vary from sport to sport (eg, among track athletes, stress fractures of the navicular, tibia, and metatarsal are common; in distance runners, the tibia and fibula; in dancers, the metatarsals).   In the , the calcaneus and metatarsals are the most common sites, especially in new recruits, due to the sudden increase in running and marching without adequate preparation.They are referred to as March fractures.   Presentation is often subtle, so a high index of suspicion is needed.   X-rays are often initially normal (and remain normal in up to half of all cases). An isotope bone scan may be required for diagnosis, although MRI is also used. Callus or periosteal reaction is sometimes seen on X-rays after 2-3 weeks.   Treatment is symptomatic with analgesia, elevation, rest and reduced activity as required. A padded insole may help. Firm shoes or boots tend to be more comfortable. A below-knee POP or Aircast® boot may be required if the patient is unable to bear weight.      Aircast walking boot   Stress fractures of the first to fourth metatarsal shafts typically heal well with rest alone. Full recovery can be expected within 6-8 weeks.   'High-risk' stress fractures include the medial malleolus, the talus, the navicular bone, the base of the fifth metatarsal, and the hallux sesamoids.      Toe fractures   Fractures of the toe are among the most common lower limb fractures.   They are most frequently caused by a crush injury or axial force such as stubbing.   Joint hyperextension and stress fractures are less common.   Most patients have point tenderness at the fracture site or pain with gentle axial loading.   X-rays usually identify fractures, determining displacement, and evaluating adjacent phalanges and digits.   Referral is indicated in patients with circulatory compromise, open  fractures, significant soft tissue injury, fracture-dislocations, displaced intra-articular fractures, or fractures of the first toe that are unstable or involve more than 25% of the joint surface.   Most children with fractures of the epiphysis should be referred.   Stable, non-displaced toe fractures should be treated with sumit taping (strapping the fractured toe to an adjacent uninjured toe) and a rigid-sole shoe to limit joint movement.   Patients with displaced fractures of the first toe may require referral for reduction and rigid immobilization. Displaced fractures of the lesser toes should be treated with reduction and sumit taping. Irreducible fractures may require open reduction and internal fixation.   Union occurs in 3-8 weeks but symptoms usually improve much earlier.          Patient Education       Open Reduction and Internal Fixation Surgery   Why is this procedure done?   Bones help us move. They also support us and protect our internal organs. A fracture is a break in a bone. Breaks are often caused by a fall or hard blow. They may be caused by a car crash or other trauma. Some diseases like cancer or osteoporosis can cause bones to break.  Surgery may be needed to put bones back in the right place. The doctor may need to use metal plates, rods, pins, nails, or screws to hold the bone in place while it heals. This is called open reduction internal fixation surgery.     What will the results be?   Your broken bone is put back in the right place to allow healing.  What happens before the procedure?   · Your doctor will take your history. Talk to your doctor about:  ? All the drugs you are taking. Be sure to include all prescription and over-the-counter (OTC) drugs, and herbal supplements. Tell the doctor about any drug allergy. Bring a list of drugs you take with you.  ? Any bleeding problems. Be sure to tell your doctor if you are taking any drugs that may cause bleeding. Some of these are  warfarin, rivaroxaban, apixaban, ticagrelor, clopidogrel, ketorolac, ibuprofen, naproxen, or aspirin. Certain vitamins and herbs, such as garlic and fish oil, may also add to the risk for bleeding. You may need to stop these drugs as well. Talk to your doctor about them.  ? Any history of blood clots you have had in the past.  ? If you need to stop eating or drinking before your procedure.  · Your doctor will do an exam and may order:  ? Lab tests  ? X-rays  ? Tetanus shot  · You will not be allowed to drive right away after the procedure. Ask a family member or a friend to drive you home.  What happens during the procedure?   · Before the procedure, the staff will put an IV in your arm to give you fluids and drugs. You will be given a drug to make you sleepy. It will also help you stay pain free during the surgery.  · Sometimes, the doctor will give you a special drug to make you numb for the surgery. Other times, you are fully asleep.  · The surgery is done in two parts:  ? Your doctor will make cuts on the skin over your broken bone. Your doctor will use special tools to move the bones back to their proper place. This is called reduction.  ? The doctor fixes the break and uses special screws, plates, wires, or rods to hold your bones together. This is called internal fixation.  · Your cut is closed with stitches or staples and covered with clean bandages. Next, your doctor may put a splint or cast on to help protect the area and control movement. This will let your bones heal and keep them in place.  · An x-ray may be taken again to check that the bones are in the right position.  · The length of the procedure will depend on the type and location of the fracture.  What happens after the procedure?   · You will go to the Recovery Room for a few hours. The staff will watch you closely. Your doctor may give you drugs for pain.  · Within a day or two, you will get out of bed to a chair. The staff will help you begin  to walk around. You may need to use a cane, crutches, or a walker to move around. Doing coughing and deep breathing exercises will help to keep your lungs clear.  · You will be shown exercises to help you keep up muscle strength and range of motion.  · You may have to stay in the hospital for 1 to 7 days, based on the type and location of the fracture. Some people are able to go home the same day of the surgery.  What drugs may be needed?   The doctor may order drugs to:  · Help with pain and swelling  · Prevent infection  · Prevent blood clots  What problems could happen?   · Infection  · Bleeding  · Nerve damage  · Blood clots  · Swelling, this may cause your cast to become too tight  · Bone does not heal the right way  · Plates and screws cause pain or become loose  · More surgery is needed  · Wound does not heal properly  Last Reviewed Date   2020-02-11  Consumer Information Use and Disclaimer   This information is not specific medical advice and does not replace information you receive from your health care provider. This is only a brief summary of general information. It does NOT include all information about conditions, illnesses, injuries, tests, procedures, treatments, therapies, discharge instructions or life-style choices that may apply to you. You must talk with your health care provider for complete information about your health and treatment options. This information should not be used to decide whether or not to accept your health care providers advice, instructions or recommendations. Only your health care provider has the knowledge and training to provide advice that is right for you.  Copyright   Copyright © 2021 [employer] and its affiliates and/or licensors. All rights reserved.

## 2022-02-15 NOTE — PROGRESS NOTES
"  [address] [facility] [address] [place] [address]  Phone: [phone]   [phone]    Patient's PCP:CORKY Gayle  Referring Provider:No ref. provider found    Subjective:      Chief Complaint: Post-Op         Date of Surgery: 2/10/2022  Procedure: Open reduction with internal fixation left cuboid    HPI:   Britta Mariee is a 21 y.o. female who returns to the clinic today for her post-operative visit. Britta Mariee rates pain a 0/10 on a pain scale. Compliance of Care: Boot cast, Ace wrap, bandage, crutches. Patient states when it does hurt it hurts on the bottom of her foot and top    Do you have a cough? no  Have you had a cough since your surgical procedure ?no  Are you short of breath?no  Have you experienced shortness of breath since your surgical procedure?no  Do you have a fever? no   Did you have a fever since your surgical procedure? no    Have you been tested for COVID-19 since your surgical procedure? no   Vitals:    02/15/22 1441   Resp: 16   Weight: 115.7 kg (255 lb)   Height: 4' 11" (1.499 m)   PainSc: 0-No pain        Past Surgical History:   Procedure Laterality Date    OPEN REDUCTION AND INTERNAL FIXATION (ORIF) OF INJURY OF FOOT Left 2/10/2022    Procedure: ORIF, FOOT;  Surgeon: Avel Ruiz DPM;  Location: [facility];  Service: Podiatry;  Laterality: Left;    WISDOM TOOTH EXTRACTION       Past Medical History:   Diagnosis Date    Asthma      Family History   Problem Relation Age of Onset    Hypertension Sister     Breast cancer Neg Hx     Cancer Neg Hx     Colon cancer Neg Hx     Miscarriages / Stillbirths Neg Hx     Diabetes Neg Hx         Social History:   Marital Status: Single  Alcohol History:  reports no history of alcohol use.  Tobacco History:  reports that she has never smoked. She has never used smokeless tobacco.  Drug History:  reports no history of drug use.    Review of patient's allergies indicates:   Allergen Reactions    Banana        Current " Outpatient Medications   Medication Sig Dispense Refill    cephALEXin (KEFLEX) 500 MG capsule Take 1 capsule (500 mg total) by mouth every 12 (twelve) hours. for 7 days 14 capsule 0    ondansetron (ZOFRAN-ODT) 4 MG TbDL Take 2 tablets (8 mg total) by mouth every 8 (eight) hours as needed (nausea). 30 tablet 0    albuterol 90 mcg/actuation inhaler Inhale 1-2 puffs into the lungs every 6 (six) hours as needed for Wheezing. Rescue 1 Inhaler 0    albuterol-ipratropium 2.5mg-0.5mg/3mL (DUO-NEB) 0.5 mg-3 mg(2.5 mg base)/3 mL nebulizer solution Take 3 mLs by nebulization every 6 (six) hours as needed for Wheezing. Rescue 1 Box 0    HYDROcodone-acetaminophen (NORCO)  mg per tablet Take 1 tablet by mouth every 6 (six) hours as needed for Pain. 30 tablet 0     No current facility-administered medications for this visit.       Review of Systems   Constitutional: Negative for chills, fatigue, fever and unexpected weight change.   HENT: Negative for hearing loss and trouble swallowing.    Eyes: Negative for photophobia and visual disturbance.   Respiratory: Negative for cough, shortness of breath and wheezing.    Cardiovascular: Negative for chest pain, palpitations and leg swelling.   Gastrointestinal: Negative for abdominal pain and nausea.   Genitourinary: Negative for dysuria and frequency.   Musculoskeletal: Positive for gait problem. Negative for arthralgias, back pain, joint swelling and myalgias.   Skin: Positive for wound. Negative for rash.   Neurological: Negative for tremors, seizures, weakness, numbness and headaches.   Hematological: Does not bruise/bleed easily.         Objective:        Post-op surgery of the left foot with normal healing, no signs of infection or dehiscence of wound. Hardware in place. Normal post op exam today. No redness, no drainage, no increase in local temperature, no significant swelling, sutures.steri-strips are intact.     Imaging:  None    Physical Exam:   Foot Exam  Physical  Exam       Assessment:       1. Closed displaced fracture of cuboid of left foot with routine healing, subsequent encounter    2. Injury of left foot, subsequent encounter    3. Left foot pain      Plan:   Closed displaced fracture of cuboid of left foot with routine healing, subsequent encounter  -     HYDROcodone-acetaminophen (NORCO)  mg per tablet; Take 1 tablet by mouth every 6 (six) hours as needed for Pain.  Dispense: 30 tablet; Refill: 0    Injury of left foot, subsequent encounter  -     HYDROcodone-acetaminophen (NORCO)  mg per tablet; Take 1 tablet by mouth every 6 (six) hours as needed for Pain.  Dispense: 30 tablet; Refill: 0    Left foot pain  -     HYDROcodone-acetaminophen (NORCO)  mg per tablet; Take 1 tablet by mouth every 6 (six) hours as needed for Pain.  Dispense: 30 tablet; Refill: 0      Follow up in about 1 week (around 2/22/2022), or if symptoms worsen or fail to improve.    Procedures - None    The surgical dressing was removed showing no signs of infection, excess edema or malalignment. A new dry dressing was applied and patient was instructed to leave dressing intact until next visit or until instructed to remove.     CAM walker boot with non-weight bearing  Ice to painful area, 15 minutes at a time  Ace-wrap to help with swelling  No barefoot   Keep affected extremity elevated while seated      This note was created using Dragon voice recognition software that occasionally misinterpreted phrases or words.

## 2022-02-22 ENCOUNTER — OFFICE VISIT (OUTPATIENT)
Dept: PODIATRY | Facility: CLINIC | Age: 22
End: 2022-02-22
Payer: MEDICAID

## 2022-02-22 VITALS
HEIGHT: 59 IN | HEART RATE: 78 BPM | WEIGHT: 255 LBS | BODY MASS INDEX: 51.41 KG/M2 | RESPIRATION RATE: 20 BRPM | OXYGEN SATURATION: 96 %

## 2022-02-22 DIAGNOSIS — S99.922D INJURY OF LEFT FOOT, SUBSEQUENT ENCOUNTER: ICD-10-CM

## 2022-02-22 DIAGNOSIS — S92.212D CLOSED DISPLACED FRACTURE OF CUBOID OF LEFT FOOT WITH ROUTINE HEALING, SUBSEQUENT ENCOUNTER: Primary | ICD-10-CM

## 2022-02-22 DIAGNOSIS — M79.672 LEFT FOOT PAIN: ICD-10-CM

## 2022-02-22 PROCEDURE — 1159F MED LIST DOCD IN RCRD: CPT | Mod: CPTII,S$GLB,, | Performed by: PODIATRIST

## 2022-02-22 PROCEDURE — 99024 POSTOP FOLLOW-UP VISIT: CPT | Mod: S$GLB,,, | Performed by: PODIATRIST

## 2022-02-22 PROCEDURE — 3008F PR BODY MASS INDEX (BMI) DOCUMENTED: ICD-10-PCS | Mod: CPTII,S$GLB,, | Performed by: PODIATRIST

## 2022-02-22 PROCEDURE — 1160F PR REVIEW ALL MEDS BY PRESCRIBER/CLIN PHARMACIST DOCUMENTED: ICD-10-PCS | Mod: CPTII,S$GLB,, | Performed by: PODIATRIST

## 2022-02-22 PROCEDURE — 99024 PR POST-OP FOLLOW-UP VISIT: ICD-10-PCS | Mod: S$GLB,,, | Performed by: PODIATRIST

## 2022-02-22 PROCEDURE — 1160F RVW MEDS BY RX/DR IN RCRD: CPT | Mod: CPTII,S$GLB,, | Performed by: PODIATRIST

## 2022-02-22 PROCEDURE — 3008F BODY MASS INDEX DOCD: CPT | Mod: CPTII,S$GLB,, | Performed by: PODIATRIST

## 2022-02-22 PROCEDURE — 1159F PR MEDICATION LIST DOCUMENTED IN MEDICAL RECORD: ICD-10-PCS | Mod: CPTII,S$GLB,, | Performed by: PODIATRIST

## 2022-02-22 RX ORDER — HYDROCODONE BITARTRATE AND ACETAMINOPHEN 10; 325 MG/1; MG/1
1 TABLET ORAL EVERY 6 HOURS PRN
Qty: 30 TABLET | Refills: 0 | Status: CANCELLED | OUTPATIENT
Start: 2022-02-22

## 2022-02-22 NOTE — LETTER
February 22, 2022      [facility] - Podiatry  [address] [place] [address]  Phone: [phone]  Fax: [phone]       Patient: Britta Mariee   YOB: 2000  Date of Visit: 02/22/2022    To Whom It May Concern:    Matilde Mariee  was at [facility] on 02/22/2022. The patient may return to work on 02/23/2022 with restrictions of remaining non-weightbearing in cam walker boot cast. If you have any questions or concerns, or if I can be of further assistance, please do not hesitate to contact me.    Sincerely,    Electronically Signed by: LUIS F Martin MA

## 2022-02-22 NOTE — LETTER
February 22, 2022      [facility] - Podiatry  [address] [place] [address]  Phone: [phone]  Fax: [phone]       Patient: Britta Mariee   YOB: 2000  Date of Visit: 02/22/2022    To Whom It May Concern:    Matilde Mariee  was at [facility] on 02/22/2022. The patient may return to work/school on 03/09/2022 with restrictions of non-weight bearing in cam walker boot cast. If you have any questions or concerns, or if I can be of further assistance, please do not hesitate to contact me.    Sincerely,      Electronically Signed by: LUIS F Martin MA

## 2022-02-22 NOTE — PROGRESS NOTES
"  [address] [facility] [address] [place] [address]  Phone: [phone]   [phone]    Patient's PCP:CORKY Gayle  Referring Provider:No ref. provider found    Subjective:      Chief Complaint: Post-Op      Date of Surgery: 2/10/2022  Procedure: Open reduction with internal fixation left cuboid      HPI:   Britta Mariee is a 21 y.o. female who returns to the clinic today for her post-operative visit. Britta Mariee rates pain a 7/10 on a pain scale. Compliance of Care: using crutches,  Boot cast, Ace wrap, and dressing intact. Patient states she has been back at work for over a week and has twisted foot in boot while scooting in rolling chair. Requesting refill on pain medication.    Do you have a cough? no  Have you had a cough since your surgical procedure ?no  Are you short of breath?no  Have you experienced shortness of breath since your surgical procedure?no  Do you have a fever? no   Did you have a fever since your surgical procedure? no    Have you been tested for COVID-19 since your surgical procedure? no     Vitals:    02/22/22 1526   Pulse: 78   Resp: 20   SpO2: 96%   Weight: 115.7 kg (255 lb)   Height: 4' 11" (1.499 m)   PainSc:   7        Past Surgical History:   Procedure Laterality Date    OPEN REDUCTION AND INTERNAL FIXATION (ORIF) OF INJURY OF FOOT Left 2/10/2022    Procedure: ORIF, FOOT;  Surgeon: Avel Ruiz DPM;  Location: [facility];  Service: Podiatry;  Laterality: Left;    WISDOM TOOTH EXTRACTION       Past Medical History:   Diagnosis Date    Asthma      Family History   Problem Relation Age of Onset    Hypertension Sister     Breast cancer Neg Hx     Cancer Neg Hx     Colon cancer Neg Hx     Miscarriages / Stillbirths Neg Hx     Diabetes Neg Hx         Social History:   Marital Status: Single  Alcohol History:  reports no history of alcohol use.  Tobacco History:  reports that she has never smoked. She has never used smokeless tobacco.  Drug History:  " reports no history of drug use.    Review of patient's allergies indicates:   Allergen Reactions    Banana        Current Outpatient Medications   Medication Sig Dispense Refill    albuterol 90 mcg/actuation inhaler Inhale 1-2 puffs into the lungs every 6 (six) hours as needed for Wheezing. Rescue 1 Inhaler 0    albuterol-ipratropium 2.5mg-0.5mg/3mL (DUO-NEB) 0.5 mg-3 mg(2.5 mg base)/3 mL nebulizer solution Take 3 mLs by nebulization every 6 (six) hours as needed for Wheezing. Rescue 1 Box 0    HYDROcodone-acetaminophen (NORCO) 7.5-325 mg per tablet Take 1 tablet by mouth every 6 (six) hours as needed for Pain. 28 tablet 0    ondansetron (ZOFRAN-ODT) 4 MG TbDL Take 2 tablets (8 mg total) by mouth every 8 (eight) hours as needed (nausea). 30 tablet 0     No current facility-administered medications for this visit.       Review of Systems   Constitutional: Negative for chills, fatigue, fever and unexpected weight change.   HENT: Negative for hearing loss and trouble swallowing.    Eyes: Negative for photophobia and visual disturbance.   Respiratory: Negative for cough, shortness of breath and wheezing.    Cardiovascular: Negative for chest pain, palpitations and leg swelling.   Gastrointestinal: Negative for abdominal pain and nausea.   Genitourinary: Negative for dysuria and frequency.   Musculoskeletal: Positive for arthralgias, gait problem and joint swelling. Negative for back pain.   Skin: Negative for rash.   Neurological: Negative for tremors, seizures, weakness, numbness and headaches.   Hematological: Does not bruise/bleed easily.         Objective:        Post-op surgery of the left foot with normal healing, no signs of infection or dehiscence of wound. Hardware in place. Normal post op exam today. No redness, no drainage, no increase in local temperature, no significant swelling, sutures.steri-strips are intact.     Imaging: none     Physical Exam:   Foot Exam  Physical Exam       Assessment:       1.  Closed displaced fracture of cuboid of left foot with routine healing, subsequent encounter    2. Injury of left foot, subsequent encounter    3. Left foot pain      Plan:   Closed displaced fracture of cuboid of left foot with routine healing, subsequent encounter  -     HYDROcodone-acetaminophen (NORCO) 7.5-325 mg per tablet; Take 1 tablet by mouth every 6 (six) hours as needed for Pain.  Dispense: 28 tablet; Refill: 0    Injury of left foot, subsequent encounter  -     HYDROcodone-acetaminophen (NORCO) 7.5-325 mg per tablet; Take 1 tablet by mouth every 6 (six) hours as needed for Pain.  Dispense: 28 tablet; Refill: 0    Left foot pain  -     HYDROcodone-acetaminophen (NORCO) 7.5-325 mg per tablet; Take 1 tablet by mouth every 6 (six) hours as needed for Pain.  Dispense: 28 tablet; Refill: 0      Follow up in about 4 weeks (around 3/22/2022), or if symptoms worsen or fail to improve, for x-ray.    Procedures - Steri-Strips removed.  Surgical incision well healed.    CAM walker boot with non-weight bearing  Ice to painful area, 15 minutes at a time  Ace-wrap to help with swelling  No barefoot   Keep affected extremity elevated while seated    Patient okay to begin showering.  Also encouraged her to work on gentle range of motion exercises while seated.      This note was created using Dragon voice recognition software that occasionally misinterpreted phrases or words.

## 2022-02-22 NOTE — PATIENT INSTRUCTIONS
Foot Fracture  You have a broken bone (fracture) in your foot. This will cause pain, swelling, and often bruising. It will usually take about 4 to 8 weeks to heal. A foot fracture may be treated with a special shoe, splint, cast, or boot.  Home care  Follow these guidelines when caring for yourself at home:  You may be given a splint, cast, shoe, or boot to keep the injured area from moving. Unless you were told otherwise, use crutches or a walker. Dont put weight on the injured foot until your health care provider says you can do so. (You can rent crutches and a walker at many pharmacies and surgical or orthopedic supply stores.) Dont put weight on a splint, or it will break.  Keep your leg elevated to reduce pain and swelling. When sleeping, put a pillow under the injured leg. When sitting, support the injured leg so it is above your waist. This is very important during the first 2 days (48 hours).  Put an ice pack on the injured area. Do this for 20 minutes every 1 to 2 hours the first day for pain relief. You can make an ice pack by wrapping a plastic bag of ice cubes in a thin towel. As the ice melts, be careful that the splint, cast, boot, or shoe doesnt get wet. You can place the ice pack directly over the splint or cast. Unless told otherwise, you can open the boot or shoe to apply the ice pack. Continue using the ice pack 3 to 4 times a day for the next 2 days. Then use the ice pack as needed to ease pain and swelling.  Keep the splint, cast, boot, or shoe dry. When bathing, protect it with a large plastic bag, rubber-banded at the top end. If a fiberglass splint or cast or boot gets wet, you can dry it with a hair dryer. Unless told otherwise, you can take off the boot or shoe to bathe.  You may use acetaminophen or ibuprofen to control pain, unless another pain medicine was prescribed. If you have chronic liver or kidney disease, talk with your healthcare provider before using these medicines. Also  talk with your provider if youve had a stomach ulcer or gastrointestinal bleeding.  Dont put creams or objects under the cast if you have itching.  Follow-up care  Follow up with your healthcare provider, or as advised. This is to make sure the bone is healing the way it should. If you were given a splint, it may be changed to a cast or boot at your follow-up visit.  X-rays may be taken. You will be told of any new findings that may affect your care.  When to seek medical advice  Call your healthcare provider right away if any of these occur:  The cast or splint cracks  The plaster cast or splint becomes wet or soft  The fiberglass cast or splint stays wet for more than 24 hours  Bad odor from the cast or wound fluid stains the cast  Tightness or pain under the cast or splint gets worse  Toes become swollen, cold, blue, numb, or tingly  You cant move your toes  Skin around cast or splint becomes red  Fever of 100.4ºF (38ºC) or higher, or as directed by your healthcare provider  Date Last Reviewed: 2/1/2017 © 2000-2017 [employer]. [address] [place] [address]. All rights reserved. This information is not intended as a substitute for professional medical care. Always follow your healthcare professional's instructions.

## 2022-02-23 RX ORDER — HYDROCODONE BITARTRATE AND ACETAMINOPHEN 7.5; 325 MG/1; MG/1
1 TABLET ORAL EVERY 6 HOURS PRN
Qty: 28 TABLET | Refills: 0 | Status: SHIPPED | OUTPATIENT
Start: 2022-02-23 | End: 2023-04-27 | Stop reason: ALTCHOICE

## 2022-03-22 NOTE — PATIENT INSTRUCTIONS
Foot Fracture  You have a broken bone (fracture) in your foot. This will cause pain, swelling, and often bruising. It will usually take about 4 to 8 weeks to heal. A foot fracture may be treated with a special shoe, splint, cast, or boot.  Home care  Follow these guidelines when caring for yourself at home:  You may be given a splint, cast, shoe, or boot to keep the injured area from moving. Unless you were told otherwise, use crutches or a walker. Dont put weight on the injured foot until your health care provider says you can do so. (You can rent crutches and a walker at many pharmacies and surgical or orthopedic supply stores.) Dont put weight on a splint, or it will break.  Keep your leg elevated to reduce pain and swelling. When sleeping, put a pillow under the injured leg. When sitting, support the injured leg so it is above your waist. This is very important during the first 2 days (48 hours).  Put an ice pack on the injured area. Do this for 20 minutes every 1 to 2 hours the first day for pain relief. You can make an ice pack by wrapping a plastic bag of ice cubes in a thin towel. As the ice melts, be careful that the splint, cast, boot, or shoe doesnt get wet. You can place the ice pack directly over the splint or cast. Unless told otherwise, you can open the boot or shoe to apply the ice pack. Continue using the ice pack 3 to 4 times a day for the next 2 days. Then use the ice pack as needed to ease pain and swelling.  Keep the splint, cast, boot, or shoe dry. When bathing, protect it with a large plastic bag, rubber-banded at the top end. If a fiberglass splint or cast or boot gets wet, you can dry it with a hair dryer. Unless told otherwise, you can take off the boot or shoe to bathe.  You may use acetaminophen or ibuprofen to control pain, unless another pain medicine was prescribed. If you have chronic liver or kidney disease, talk with your healthcare provider before using these medicines. Also  talk with your provider if youve had a stomach ulcer or gastrointestinal bleeding.  Dont put creams or objects under the cast if you have itching.  Follow-up care  Follow up with your healthcare provider, or as advised. This is to make sure the bone is healing the way it should. If you were given a splint, it may be changed to a cast or boot at your follow-up visit.  X-rays may be taken. You will be told of any new findings that may affect your care.  When to seek medical advice  Call your healthcare provider right away if any of these occur:  The cast or splint cracks  The plaster cast or splint becomes wet or soft  The fiberglass cast or splint stays wet for more than 24 hours  Bad odor from the cast or wound fluid stains the cast  Tightness or pain under the cast or splint gets worse  Toes become swollen, cold, blue, numb, or tingly  You cant move your toes  Skin around cast or splint becomes red  Fever of 100.4ºF (38ºC) or higher, or as directed by your healthcare provider  Date Last Reviewed: 2/1/2017 © 2000-2017 [employer]. [address] [place] [address]. All rights reserved. This information is not intended as a substitute for professional medical care. Always follow your healthcare professional's instructions.

## 2022-03-23 ENCOUNTER — OFFICE VISIT (OUTPATIENT)
Dept: PODIATRY | Facility: CLINIC | Age: 22
End: 2022-03-23
Payer: MEDICAID

## 2022-03-23 ENCOUNTER — HOSPITAL ENCOUNTER (OUTPATIENT)
Dept: RADIOLOGY | Facility: CLINIC | Age: 22
Discharge: HOME | End: 2022-03-23
Attending: PODIATRIST
Payer: MEDICAID

## 2022-03-23 VITALS — BODY MASS INDEX: 51.41 KG/M2 | WEIGHT: 255 LBS | OXYGEN SATURATION: 96 % | HEART RATE: 78 BPM | HEIGHT: 59 IN

## 2022-03-23 DIAGNOSIS — M79.672 LEFT FOOT PAIN: ICD-10-CM

## 2022-03-23 DIAGNOSIS — S92.212D CLOSED DISPLACED FRACTURE OF CUBOID OF LEFT FOOT WITH ROUTINE HEALING, SUBSEQUENT ENCOUNTER: Primary | ICD-10-CM

## 2022-03-23 DIAGNOSIS — S99.922D INJURY OF LEFT FOOT, SUBSEQUENT ENCOUNTER: ICD-10-CM

## 2022-03-23 PROCEDURE — 1160F PR REVIEW ALL MEDS BY PRESCRIBER/CLIN PHARMACIST DOCUMENTED: ICD-10-PCS | Mod: CPTII,S$GLB,, | Performed by: PODIATRIST

## 2022-03-23 PROCEDURE — 3008F PR BODY MASS INDEX (BMI) DOCUMENTED: ICD-10-PCS | Mod: CPTII,S$GLB,, | Performed by: PODIATRIST

## 2022-03-23 PROCEDURE — 1159F PR MEDICATION LIST DOCUMENTED IN MEDICAL RECORD: ICD-10-PCS | Mod: CPTII,S$GLB,, | Performed by: PODIATRIST

## 2022-03-23 PROCEDURE — 73630 XR FOOT COMPLETE 3 VIEW LEFT: ICD-10-PCS | Mod: LT,S$GLB,, | Performed by: RADIOLOGY

## 2022-03-23 PROCEDURE — 73630 X-RAY EXAM OF FOOT: CPT | Mod: LT,S$GLB,, | Performed by: RADIOLOGY

## 2022-03-23 PROCEDURE — 3008F BODY MASS INDEX DOCD: CPT | Mod: CPTII,S$GLB,, | Performed by: PODIATRIST

## 2022-03-23 PROCEDURE — 1160F RVW MEDS BY RX/DR IN RCRD: CPT | Mod: CPTII,S$GLB,, | Performed by: PODIATRIST

## 2022-03-23 PROCEDURE — 1159F MED LIST DOCD IN RCRD: CPT | Mod: CPTII,S$GLB,, | Performed by: PODIATRIST

## 2022-03-23 PROCEDURE — 99024 POSTOP FOLLOW-UP VISIT: CPT | Mod: S$GLB,,, | Performed by: PODIATRIST

## 2022-03-23 PROCEDURE — 99024 PR POST-OP FOLLOW-UP VISIT: ICD-10-PCS | Mod: S$GLB,,, | Performed by: PODIATRIST

## 2022-03-23 NOTE — PROGRESS NOTES
"  [address] [facility] [place] [address]  Phone: [phone]   [phone]    Patient's PCP:CORKY Gayle  Referring Provider:No ref. provider found    Subjective:      Chief Complaint: Post-op Evaluation (ORIF left cuboid)          Date of Surgery: 2/10/2022  Procedure: ORIF left cuboid    HPI:   Britta Mariee is a 21 y.o. female who returns to the clinic today for her post-operative visit. Britta Mariee rates pain a 5/10 on a pain scale. Compliance of Care: Patient presents in boot cast and crutches.  Patient states that she has tried putting some weight on the foot.    Vitals:    03/23/22 1038   Pulse: 78   SpO2: 96%   Weight: 115.7 kg (255 lb)   Height: 4' 11" (1.499 m)   PainSc:   5        Past Surgical History:   Procedure Laterality Date    OPEN REDUCTION AND INTERNAL FIXATION (ORIF) OF INJURY OF FOOT Left 2/10/2022    Procedure: ORIF, FOOT;  Surgeon: Avel Ruiz DPM;  Location: [facility];  Service: Podiatry;  Laterality: Left;    WISDOM TOOTH EXTRACTION       Past Medical History:   Diagnosis Date    Asthma      Family History   Problem Relation Age of Onset    Hypertension Sister     Breast cancer Neg Hx     Cancer Neg Hx     Colon cancer Neg Hx     Miscarriages / Stillbirths Neg Hx     Diabetes Neg Hx         Social History:   Marital Status: Single  Alcohol History:  reports no history of alcohol use.  Tobacco History:  reports that she has never smoked. She has never used smokeless tobacco.  Drug History:  reports no history of drug use.    Review of patient's allergies indicates:   Allergen Reactions    Banana        Current Outpatient Medications   Medication Sig Dispense Refill    albuterol 90 mcg/actuation inhaler Inhale 1-2 puffs into the lungs every 6 (six) hours as needed for Wheezing. Rescue 1 Inhaler 0    albuterol-ipratropium 2.5mg-0.5mg/3mL (DUO-NEB) 0.5 mg-3 mg(2.5 mg base)/3 mL nebulizer solution Take 3 mLs by nebulization every 6 (six) " hours as needed for Wheezing. Rescue 1 Box 0    HYDROcodone-acetaminophen (NORCO) 7.5-325 mg per tablet Take 1 tablet by mouth every 6 (six) hours as needed for Pain. (Patient not taking: Reported on 3/23/2022) 28 tablet 0    ondansetron (ZOFRAN-ODT) 4 MG TbDL Take 2 tablets (8 mg total) by mouth every 8 (eight) hours as needed (nausea). (Patient not taking: Reported on 3/23/2022) 30 tablet 0     No current facility-administered medications for this visit.       Review of Systems   Constitutional: Negative for chills, fatigue, fever and unexpected weight change.   HENT: Negative for hearing loss and trouble swallowing.    Eyes: Negative for photophobia and visual disturbance.   Respiratory: Negative for cough, shortness of breath and wheezing.    Cardiovascular: Negative for chest pain, palpitations and leg swelling.   Gastrointestinal: Negative for abdominal pain and nausea.   Genitourinary: Negative for dysuria and frequency.   Musculoskeletal: Positive for arthralgias, gait problem and joint swelling. Negative for myalgias.   Skin: Negative for rash and wound.   Neurological: Negative for seizures, weakness, numbness and headaches.   Hematological: Does not bruise/bleed easily.         Objective:        Post-op surgery of the left foot with normal healing, no signs of infection or dehiscence of wound. Hardware in place. Normal post op exam today. No redness, no drainage, no increase in local temperature, no significant swelling, surgical incision is well healed.    Imaging: X-Ray Foot Complete Left  Narrative: EXAMINATION:  XR FOOT COMPLETE 3 VIEW LEFT    CLINICAL HISTORY:  .  Displaced fracture of cuboid bone of left foot, subsequent encounter for fracture with routine healing    TECHNIQUE:  AP, lateral and oblique views of the left foot were performed.    COMPARISON:  01/24/2022    FINDINGS:  Interval internal fixation of the calcaneal fracture by means of 2 orthopedic screws.  Position and alignment appear  good    Soft tissue swelling anterior to the metatarsals.    No dislocation  Impression: As above    Electronically signed by: Reina Ace MD  Date:    03/23/2022  Time:    11:59        Physical Exam:   Foot Exam  Physical Exam       Assessment:       1. Closed displaced fracture of cuboid of left foot with routine healing, subsequent encounter    2. Injury of left foot, subsequent encounter    3. Left foot pain      Plan:   Closed displaced fracture of cuboid of left foot with routine healing, subsequent encounter  -     X-Ray Foot Complete Left    Injury of left foot, subsequent encounter  -     X-Ray Foot Complete Left    Left foot pain  -     X-Ray Foot Complete Left      Follow up in about 4 weeks (around 4/20/2022), or if symptoms worsen or fail to improve, for x-ray.    Procedures - None    CAM walker boot with weight bearing  Ice to painful area, 15 minutes at a time  Ace-wrap to help with swelling  No barefoot   Keep affected extremity elevated while seated    Patient can attempt to transition to supportive running type shoe in 2-3 weeks as symptoms allow.    This note was created using Dragon voice recognition software that occasionally misinterpreted phrases or words.

## 2022-07-06 ENCOUNTER — OCCUPATIONAL HEALTH (OUTPATIENT)
Dept: URGENT CARE | Facility: CLINIC | Age: 22
End: 2022-07-06

## 2022-07-06 DIAGNOSIS — Z13.9 ENCOUNTER FOR SCREENING: Primary | ICD-10-CM

## 2022-07-06 LAB
COLLECTION ONLY: NORMAL
CTP QC/QA: YES
POC 5 PANEL DRUG SCREEN: NEGATIVE
TB INDURATION - 48 HR READ: NORMAL
TB INDURATION - 72 HR READ: NORMAL
TB SKIN TEST - 48 HR READ: NORMAL
TB SKIN TEST - 72 HR READ: NORMAL

## 2022-07-06 PROCEDURE — 86580 POCT TB SKIN TEST: ICD-10-PCS | Mod: S$GLB,,, | Performed by: NURSE PRACTITIONER

## 2022-07-06 PROCEDURE — 86580 TB INTRADERMAL TEST: CPT | Mod: S$GLB,,, | Performed by: NURSE PRACTITIONER

## 2022-07-06 PROCEDURE — 80305 DRUG TEST PRSMV DIR OPT OBS: CPT | Mod: S$GLB,,, | Performed by: NURSE PRACTITIONER

## 2022-07-06 PROCEDURE — 80305 POCT RAPID DRUG SCREEN 5 PANEL: ICD-10-PCS | Mod: S$GLB,,, | Performed by: NURSE PRACTITIONER

## 2023-04-27 ENCOUNTER — OFFICE VISIT (OUTPATIENT)
Dept: FAMILY MEDICINE | Facility: CLINIC | Age: 23
End: 2023-04-27
Payer: COMMERCIAL

## 2023-04-27 VITALS
TEMPERATURE: 98 F | DIASTOLIC BLOOD PRESSURE: 77 MMHG | SYSTOLIC BLOOD PRESSURE: 138 MMHG | HEART RATE: 92 BPM | RESPIRATION RATE: 20 BRPM | HEIGHT: 59 IN | WEIGHT: 274.31 LBS | BODY MASS INDEX: 55.3 KG/M2

## 2023-04-27 DIAGNOSIS — M54.50 CHRONIC MIDLINE LOW BACK PAIN WITHOUT SCIATICA: ICD-10-CM

## 2023-04-27 DIAGNOSIS — F32.A ANXIETY AND DEPRESSION: ICD-10-CM

## 2023-04-27 DIAGNOSIS — Z00.00 ANNUAL PHYSICAL EXAM: Primary | ICD-10-CM

## 2023-04-27 DIAGNOSIS — N64.89 PENDULOUS BREAST: ICD-10-CM

## 2023-04-27 DIAGNOSIS — Z13.31 POSITIVE DEPRESSION SCREENING: ICD-10-CM

## 2023-04-27 DIAGNOSIS — F41.9 ANXIETY AND DEPRESSION: ICD-10-CM

## 2023-04-27 DIAGNOSIS — G89.29 CHRONIC MIDLINE LOW BACK PAIN WITHOUT SCIATICA: ICD-10-CM

## 2023-04-27 PROCEDURE — 3075F SYST BP GE 130 - 139MM HG: CPT | Mod: CPTII,S$GLB,, | Performed by: NURSE PRACTITIONER

## 2023-04-27 PROCEDURE — 3075F PR MOST RECENT SYSTOLIC BLOOD PRESS GE 130-139MM HG: ICD-10-PCS | Mod: CPTII,S$GLB,, | Performed by: NURSE PRACTITIONER

## 2023-04-27 PROCEDURE — 99204 PR OFFICE/OUTPT VISIT, NEW, LEVL IV, 45-59 MIN: ICD-10-PCS | Mod: S$GLB,,, | Performed by: NURSE PRACTITIONER

## 2023-04-27 PROCEDURE — 1159F MED LIST DOCD IN RCRD: CPT | Mod: CPTII,S$GLB,, | Performed by: NURSE PRACTITIONER

## 2023-04-27 PROCEDURE — 3008F BODY MASS INDEX DOCD: CPT | Mod: CPTII,S$GLB,, | Performed by: NURSE PRACTITIONER

## 2023-04-27 PROCEDURE — 3078F PR MOST RECENT DIASTOLIC BLOOD PRESSURE < 80 MM HG: ICD-10-PCS | Mod: CPTII,S$GLB,, | Performed by: NURSE PRACTITIONER

## 2023-04-27 PROCEDURE — 1159F PR MEDICATION LIST DOCUMENTED IN MEDICAL RECORD: ICD-10-PCS | Mod: CPTII,S$GLB,, | Performed by: NURSE PRACTITIONER

## 2023-04-27 PROCEDURE — 3078F DIAST BP <80 MM HG: CPT | Mod: CPTII,S$GLB,, | Performed by: NURSE PRACTITIONER

## 2023-04-27 PROCEDURE — 99204 OFFICE O/P NEW MOD 45 MIN: CPT | Mod: S$GLB,,, | Performed by: NURSE PRACTITIONER

## 2023-04-27 PROCEDURE — 3008F PR BODY MASS INDEX (BMI) DOCUMENTED: ICD-10-PCS | Mod: CPTII,S$GLB,, | Performed by: NURSE PRACTITIONER

## 2023-04-27 RX ORDER — HYDROXYZINE HYDROCHLORIDE 25 MG/1
25 TABLET, FILM COATED ORAL 3 TIMES DAILY PRN
Qty: 30 TABLET | Refills: 1 | Status: SHIPPED | OUTPATIENT
Start: 2023-04-27 | End: 2024-02-07

## 2023-04-27 NOTE — PROGRESS NOTES
Patient ID: Britta Mariee is a 22 y.o. female.    Chief Complaint: Establish Care (23 yo female here to establish care with PCP. Pt states constant lower back pain for years due to heavy breast area. Pt also concern about not being able to stay focus with short memory loss times.. years and very emotional/depressed and sad all the times. KM)    Ms. Mariee is a very pleasant 23 yo who presents today to establish care with me as her PCP. She has a PMH of Asthma. She states as of late she has been having some difficulty with concentrating especially at work. 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 She states she has been having chronic back and shoulder pain due to her heavy breast. She is inquiring about process to have breast reduction. This will be referred to general surgery for further discussion. She is also requesting referral to psychiatry at this time for anxiety and depression. She denies suicidal ideation or thoughts of self harm. She is also having a difficult time due to this being the anniversary of her friend's passing.     Depression  Visit Type: initial  Onset of symptoms: more than 1 year ago  Progression since onset: gradually worsening  Patient presents with the following symptoms: anhedonia, decreased concentration, depressed mood, fatigue, insomnia, irritability, nervousness/anxiety and restlessness.  Patient is not experiencing: confusion, palpitations and shortness of breath.  Frequency of symptoms: most days   Severity: causing significant distress   Aggravated by: work stress and social activities  Sleep per night: 5 hours  Sleep quality: fair  Nighttime awakenings: occasional  Risk factors: personality disorder, prior traumatic experience, major life event and family history  Patient has a history of: anxiety/panic attacks and depression  Treatment tried: nothing    Anxiety  Presents for initial visit. Onset was 1 to 5 years ago. The problem has been gradually worsening.  Symptoms include decreased concentration, depressed mood, insomnia, irritability, nervous/anxious behavior and restlessness. Patient reports no chest pain, confusion, dizziness, nausea, palpitations or shortness of breath. Symptoms occur most days. The severity of symptoms is causing significant distress. The symptoms are aggravated by family issues and work stress.     Risk factors include a major life event and family history. Her past medical history is significant for anxiety/panic attacks.   Back Pain  This is a chronic problem. The current episode started more than 1 year ago. The problem occurs constantly. The problem has been rapidly worsening since onset. The pain is present in the lumbar spine and thoracic spine. The quality of the pain is described as aching. The pain does not radiate. The pain is at a severity of 6/10. The pain is moderate. The pain is The same all the time. Stiffness is present All day. Pertinent negatives include no abdominal pain, chest pain, dysuria, fever or headaches. Risk factors include sedentary lifestyle. She has tried nothing for the symptoms. The treatment provided no relief.         Past Medical History:   Diagnosis Date    Asthma      Past Surgical History:   Procedure Laterality Date    OPEN REDUCTION AND INTERNAL FIXATION (ORIF) OF INJURY OF FOOT Left 2/10/2022    Procedure: ORIF, FOOT;  Surgeon: Avel Ruiz DPM;  Location: [facility];  Service: Podiatry;  Laterality: Left;    WISDOM TOOTH EXTRACTION           Tobacco History:  reports that she has been smoking vaping with nicotine. She has never used smokeless tobacco.      Review of patient's allergies indicates:   Allergen Reactions    Banana        Current Outpatient Medications:     hydrOXYzine HCL (ATARAX) 25 MG tablet, Take 1 tablet (25 mg total) by mouth 3 (three) times daily as needed for Anxiety., Disp: 30 tablet, Rfl: 1    Review of Systems   Constitutional:  Positive for activity change, fatigue and  "irritability. Negative for appetite change, fever and unexpected weight change.   HENT:  Negative for congestion, mouth sores, nosebleeds, postnasal drip, rhinorrhea, sinus pressure, sinus pain, sneezing, sore throat and trouble swallowing.    Eyes:  Negative for pain, redness and itching.   Respiratory:  Negative for chest tightness and shortness of breath.    Cardiovascular:  Negative for chest pain and palpitations.   Gastrointestinal:  Negative for abdominal pain, blood in stool, constipation, diarrhea, nausea and vomiting.   Endocrine: Negative for cold intolerance, heat intolerance, polydipsia, polyphagia and polyuria.   Genitourinary:  Negative for difficulty urinating, dysuria, flank pain, frequency, genital sores, menstrual problem, urgency, vaginal bleeding and vaginal discharge.   Musculoskeletal:  Positive for arthralgias and back pain. Negative for myalgias.   Skin:  Negative for rash and wound.   Allergic/Immunologic: Negative for environmental allergies and food allergies.   Neurological:  Negative for dizziness, light-headedness and headaches.   Hematological:  Negative for adenopathy. Does not bruise/bleed easily.   Psychiatric/Behavioral:  Positive for agitation, decreased concentration, depression and dysphoric mood. Negative for confusion, hallucinations, self-injury and sleep disturbance. The patient is nervous/anxious and has insomnia.         Objective:      Vitals:    04/27/23 0825   BP: 138/77   Pulse: 92   Resp: 20   Temp: 98 °F (36.7 °C)   TempSrc: Oral   Weight: 124.4 kg (274 lb 4.8 oz)   Height: 4' 11.02" (1.499 m)     Physical Exam  Vitals reviewed.   Constitutional:       General: She is not in acute distress.     Appearance: Normal appearance. She is obese. She is not ill-appearing, toxic-appearing or diaphoretic.   HENT:      Head: Normocephalic and atraumatic.      Right Ear: Tympanic membrane and external ear normal. There is no impacted cerumen.      Left Ear: Tympanic membrane " and external ear normal. There is no impacted cerumen.      Nose: Nose normal. No congestion or rhinorrhea.      Mouth/Throat:      Mouth: Mucous membranes are moist.      Pharynx: Oropharynx is clear. No oropharyngeal exudate or posterior oropharyngeal erythema.   Eyes:      General: No scleral icterus.        Right eye: No discharge.         Left eye: No discharge.      Extraocular Movements: Extraocular movements intact.      Conjunctiva/sclera: Conjunctivae normal.      Pupils: Pupils are equal, round, and reactive to light.   Neck:      Vascular: No carotid bruit.   Cardiovascular:      Rate and Rhythm: Normal rate and regular rhythm.      Pulses: Normal pulses.      Heart sounds: Normal heart sounds. No murmur heard.    No friction rub. No gallop.   Pulmonary:      Effort: Pulmonary effort is normal. No respiratory distress.      Breath sounds: Normal breath sounds. No stridor. No rales.   Chest:      Chest wall: No tenderness.   Abdominal:      General: Abdomen is flat. Bowel sounds are normal.      Palpations: Abdomen is soft. There is no mass.      Tenderness: There is no abdominal tenderness. There is no guarding.   Musculoskeletal:         General: No swelling or signs of injury. Normal range of motion.      Cervical back: Normal range of motion and neck supple. No rigidity or tenderness.      Right lower leg: No edema.      Left lower leg: No edema.   Skin:     General: Skin is warm and dry.      Capillary Refill: Capillary refill takes less than 2 seconds.      Findings: No bruising, lesion or rash.   Neurological:      General: No focal deficit present.      Mental Status: She is alert and oriented to person, place, and time. Mental status is at baseline.      Motor: No weakness.      Coordination: Coordination normal.   Psychiatric:         Mood and Affect: Mood is anxious and depressed. Affect is flat.         Behavior: Behavior normal.         Thought Content: Thought content normal.          Judgment: Judgment normal.         Assessment:       1. Annual physical exam    2. Pendulous breast    3. Chronic midline low back pain without sciatica    4. Anxiety and depression    5. Positive depression screening           Plan:       Annual physical exam  -     Hepatitis C Antibody; Future; Expected date: 04/27/2023  -     Lipid Panel; Future; Expected date: 04/27/2023  -     HIV 1/2 Ag/Ab (4th Gen); Future; Expected date: 04/27/2023  -     CBC auto differential; Future; Expected date: 04/27/2023  -     Comprehensive Metabolic Panel; Future; Expected date: 04/27/2023  -     TSH; Future; Expected date: 04/27/2023  -     T4, Free; Future; Expected date: 04/27/2023  -     Hemoglobin A1C; Future; Expected date: 04/27/2023    Pendulous breast  -     Ambulatory referral/consult to General Surgery; Future; Expected date: 05/04/2023    Chronic midline low back pain without sciatica  -     Ambulatory referral/consult to General Surgery; Future; Expected date: 05/04/2023    Anxiety and depression  -     Ambulatory referral/consult to Psychiatry; Future; Expected date: 05/04/2023  -     TSH; Future; Expected date: 04/27/2023  -     T4, Free; Future; Expected date: 04/27/2023  -     hydrOXYzine HCL (ATARAX) 25 MG tablet; Take 1 tablet (25 mg total) by mouth 3 (three) times daily as needed for Anxiety.  Dispense: 30 tablet; Refill: 1    Positive depression screening  Comments:  I have reviewed the positive depression score which warrants active treatment with psychotherapy and/or medications.      No follow-ups on file.        4/30/2023 Rhonda Oconnor NP  I have reviewed the positive depression score which warrants active treatment with psychotherapy and/or medications. Patient started on medication for anxiety.

## 2023-05-02 ENCOUNTER — TELEPHONE (OUTPATIENT)
Dept: FAMILY MEDICINE | Facility: CLINIC | Age: 23
End: 2023-05-02

## 2023-05-02 DIAGNOSIS — Z41.1 ENCOUNTER FOR BREAST AUGMENTATION: Primary | ICD-10-CM

## 2023-05-02 DIAGNOSIS — N64.89 PENDULOUS BREAST: ICD-10-CM

## 2023-07-19 ENCOUNTER — PATIENT MESSAGE (OUTPATIENT)
Dept: RESEARCH | Facility: HOSPITAL | Age: 23
End: 2023-07-19
Payer: COMMERCIAL

## 2023-12-05 ENCOUNTER — OCCUPATIONAL HEALTH (OUTPATIENT)
Dept: URGENT CARE | Facility: CLINIC | Age: 23
End: 2023-12-05
Payer: COMMERCIAL

## 2023-12-05 DIAGNOSIS — Z13.9 ENCOUNTER FOR SCREENING: Primary | ICD-10-CM

## 2023-12-05 LAB
COLLECTION ONLY: NORMAL
TB INDURATION - 48 HR READ: NORMAL
TB INDURATION - 72 HR READ: NORMAL
TB SKIN TEST - 48 HR READ: NORMAL
TB SKIN TEST - 72 HR READ: NORMAL

## 2023-12-05 PROCEDURE — 86580 POCT TB SKIN TEST: ICD-10-PCS | Mod: S$GLB,,, | Performed by: NURSE PRACTITIONER

## 2023-12-05 PROCEDURE — 86580 TB INTRADERMAL TEST: CPT | Mod: S$GLB,,, | Performed by: NURSE PRACTITIONER

## 2023-12-06 ENCOUNTER — OFFICE VISIT (OUTPATIENT)
Dept: FAMILY MEDICINE | Facility: CLINIC | Age: 23
End: 2023-12-06
Payer: COMMERCIAL

## 2023-12-06 VITALS
TEMPERATURE: 98 F | RESPIRATION RATE: 20 BRPM | SYSTOLIC BLOOD PRESSURE: 118 MMHG | HEIGHT: 59 IN | BODY MASS INDEX: 59.07 KG/M2 | DIASTOLIC BLOOD PRESSURE: 72 MMHG | WEIGHT: 293 LBS | HEART RATE: 100 BPM

## 2023-12-06 DIAGNOSIS — E66.01 CLASS 3 SEVERE OBESITY WITH BODY MASS INDEX (BMI) OF 50.0 TO 59.9 IN ADULT, UNSPECIFIED OBESITY TYPE, UNSPECIFIED WHETHER SERIOUS COMORBIDITY PRESENT: Primary | ICD-10-CM

## 2023-12-06 PROCEDURE — 99213 OFFICE O/P EST LOW 20 MIN: CPT | Mod: S$GLB,,, | Performed by: NURSE PRACTITIONER

## 2023-12-06 PROCEDURE — 99213 PR OFFICE/OUTPT VISIT, EST, LEVL III, 20-29 MIN: ICD-10-PCS | Mod: S$GLB,,, | Performed by: NURSE PRACTITIONER

## 2023-12-06 PROCEDURE — 3078F DIAST BP <80 MM HG: CPT | Mod: CPTII,S$GLB,, | Performed by: NURSE PRACTITIONER

## 2023-12-06 PROCEDURE — 3078F PR MOST RECENT DIASTOLIC BLOOD PRESSURE < 80 MM HG: ICD-10-PCS | Mod: CPTII,S$GLB,, | Performed by: NURSE PRACTITIONER

## 2023-12-06 PROCEDURE — 1159F PR MEDICATION LIST DOCUMENTED IN MEDICAL RECORD: ICD-10-PCS | Mod: CPTII,S$GLB,, | Performed by: NURSE PRACTITIONER

## 2023-12-06 PROCEDURE — 3008F BODY MASS INDEX DOCD: CPT | Mod: CPTII,S$GLB,, | Performed by: NURSE PRACTITIONER

## 2023-12-06 PROCEDURE — 3074F SYST BP LT 130 MM HG: CPT | Mod: CPTII,S$GLB,, | Performed by: NURSE PRACTITIONER

## 2023-12-06 PROCEDURE — 3008F PR BODY MASS INDEX (BMI) DOCUMENTED: ICD-10-PCS | Mod: CPTII,S$GLB,, | Performed by: NURSE PRACTITIONER

## 2023-12-06 PROCEDURE — 3074F PR MOST RECENT SYSTOLIC BLOOD PRESSURE < 130 MM HG: ICD-10-PCS | Mod: CPTII,S$GLB,, | Performed by: NURSE PRACTITIONER

## 2023-12-06 PROCEDURE — 1159F MED LIST DOCD IN RCRD: CPT | Mod: CPTII,S$GLB,, | Performed by: NURSE PRACTITIONER

## 2023-12-06 NOTE — PROGRESS NOTES
" Patient ID: Britta Mariee is a 23 y.o. female.    Chief Complaint: Consult (24 yo female here to consult about a private medial issue. Pt completed physical elsewhere yesterday. KM)    Ms. Mariee presents today for referral placement. She states she is wanting to lose weight as she is often in a significant amount of pain in her back and knees due to excess weight. She has tried conventional weight loss methods such as diet and exercise with minimal results. She is now interested in possible surgical options and therefore would like referral. She still has not had blood work ordered as previously requested but she will have it done within the upcoming weeks. She denies any other issues or complaints.       Past Medical History:   Diagnosis Date    Asthma      Past Surgical History:   Procedure Laterality Date    OPEN REDUCTION AND INTERNAL FIXATION (ORIF) OF INJURY OF FOOT Left 2/10/2022    Procedure: ORIF, FOOT;  Surgeon: Avel Ruiz DPM;  Location: [facility];  Service: Podiatry;  Laterality: Left;    WISDOM TOOTH EXTRACTION           Tobacco History:  reports that she has been smoking vaping with nicotine. She has never used smokeless tobacco.      Review of patient's allergies indicates:   Allergen Reactions    Banana        Current Outpatient Medications:     hydrOXYzine HCL (ATARAX) 25 MG tablet, Take 1 tablet (25 mg total) by mouth 3 (three) times daily as needed for Anxiety., Disp: 30 tablet, Rfl: 1    Review of Systems   Constitutional:  Positive for activity change, fatigue and unexpected weight change.   Musculoskeletal:  Positive for arthralgias and back pain.   Psychiatric/Behavioral:  Positive for dysphoric mood.           Objective:      Vitals:    12/06/23 0833   BP: 118/72   Pulse: 100   Resp: 20   Temp: 98.1 °F (36.7 °C)   TempSrc: Oral   Weight: 134.8 kg (297 lb 1.6 oz)   Height: 4' 11.02" (1.499 m)     Physical Exam  Constitutional:       Appearance: She is morbidly obese.      Comments: " BMI 59.97   Cardiovascular:      Rate and Rhythm: Regular rhythm.      Heart sounds: Normal heart sounds.   Pulmonary:      Effort: Pulmonary effort is normal.      Breath sounds: Normal breath sounds.   Neurological:      Mental Status: She is alert and oriented to person, place, and time.           Assessment:       1. Class 3 severe obesity with body mass index (BMI) of 50.0 to 59.9 in adult, unspecified obesity type, unspecified whether serious comorbidity present           Plan:       Class 3 severe obesity with body mass index (BMI) of 50.0 to 59.9 in adult, unspecified obesity type, unspecified whether serious comorbidity present  -     Ambulatory referral/consult to Bariatric Medicine; Future; Expected date: 12/13/2023    Counseled on heart healthy diet rich in fiber, fresh vegetables and fruit and low in saturated fats (fried foods, red meat, etc.).  I also recommend regular exercise including a minimum of 150 minutes of cardio exercise per week and 20-30 minute workouts of strength training like light weights, yoga, pilates, etc.      No follow-ups on file.        12/6/2023 Rhonda Oconnor NP

## 2023-12-19 ENCOUNTER — TELEPHONE (OUTPATIENT)
Dept: BARIATRICS | Facility: CLINIC | Age: 23
End: 2023-12-19
Payer: COMMERCIAL

## 2024-01-22 ENCOUNTER — TELEPHONE (OUTPATIENT)
Dept: BARIATRICS | Facility: CLINIC | Age: 24
End: 2024-01-22
Payer: COMMERCIAL

## 2024-01-22 NOTE — TELEPHONE ENCOUNTER
Pt called to reschedule her appt. Pt offered first available. Pt agreed to new date and time. Added to appt waiting list per pt request.

## 2024-01-22 NOTE — TELEPHONE ENCOUNTER
"Received this message from Candace Lujan NP [employer]: "can you call lisa santana, she called juan josé and requested a change in appt". Called pt to reschedule appt, no anwser. Left VM.   "

## 2024-02-07 ENCOUNTER — OFFICE VISIT (OUTPATIENT)
Dept: BARIATRICS | Facility: CLINIC | Age: 24
End: 2024-02-07
Payer: COMMERCIAL

## 2024-02-07 VITALS
HEIGHT: 59 IN | SYSTOLIC BLOOD PRESSURE: 132 MMHG | HEART RATE: 92 BPM | BODY MASS INDEX: 59.07 KG/M2 | RESPIRATION RATE: 16 BRPM | DIASTOLIC BLOOD PRESSURE: 73 MMHG | WEIGHT: 293 LBS | TEMPERATURE: 98 F

## 2024-02-07 DIAGNOSIS — Z71.3 PRE-BARIATRIC SURGERY NUTRITION EVALUATION: ICD-10-CM

## 2024-02-07 DIAGNOSIS — Z13.21 ENCOUNTER FOR VITAMIN DEFICIENCY SCREENING: ICD-10-CM

## 2024-02-07 DIAGNOSIS — E66.01 MORBID OBESITY: Primary | ICD-10-CM

## 2024-02-07 DIAGNOSIS — Z71.89 ENCOUNTER FOR PRE-BARIATRIC SURGERY COUNSELING AND EDUCATION: ICD-10-CM

## 2024-02-07 DIAGNOSIS — R63.5 WEIGHT GAIN: ICD-10-CM

## 2024-02-07 PROCEDURE — 3075F SYST BP GE 130 - 139MM HG: CPT | Mod: CPTII,S$GLB,, | Performed by: NURSE PRACTITIONER

## 2024-02-07 PROCEDURE — 1159F MED LIST DOCD IN RCRD: CPT | Mod: CPTII,S$GLB,, | Performed by: NURSE PRACTITIONER

## 2024-02-07 PROCEDURE — 3078F DIAST BP <80 MM HG: CPT | Mod: CPTII,S$GLB,, | Performed by: NURSE PRACTITIONER

## 2024-02-07 PROCEDURE — 99999 PR PBB SHADOW E&M-EST. PATIENT-LVL V: CPT | Mod: PBBFAC,,, | Performed by: NURSE PRACTITIONER

## 2024-02-07 PROCEDURE — 3008F BODY MASS INDEX DOCD: CPT | Mod: CPTII,S$GLB,, | Performed by: NURSE PRACTITIONER

## 2024-02-07 PROCEDURE — 99204 OFFICE O/P NEW MOD 45 MIN: CPT | Mod: S$GLB,,, | Performed by: NURSE PRACTITIONER

## 2024-02-07 PROCEDURE — 1160F RVW MEDS BY RX/DR IN RCRD: CPT | Mod: CPTII,S$GLB,, | Performed by: NURSE PRACTITIONER

## 2024-02-07 NOTE — PROGRESS NOTES
Initial Consult    Chief Complaint   Patient presents with    Consult    Obesity       History of Present Illness:  Patient is a 23 y.o. female who is referred for evaluation of surgical treatment of morbid obesity. Her Body mass index is 60.16 kg/m². She has known comorbidities of  none . She has not attended the bariatric seminar and is most interested in  hearing options .      Past attempts at weight loss include:   Unsupervised: gym memberships  Supervised:  Keto  Diet pills: Laxatives  Exercise attempts: treadmill, walking, running    Weight history:   At current weight:  1 year  Obese for 10+ years.  More than 35 pounds overweight for 10+ years.  More than 100 pounds overweight for 5+ years.  Started dieting at 15 years old.  Maximum weight reached: 297 lb  Most weight lost was 20 lbs through walking for a few years.  She describes Her eating habits as volume, sweets, skipping meals.    MYNOR screening: none    Reflux screening: none      Past Medical History:   Diagnosis Date    Asthma      Past Surgical History:   Procedure Laterality Date    OPEN REDUCTION AND INTERNAL FIXATION (ORIF) OF INJURY OF FOOT Left 2/10/2022    Procedure: ORIF, FOOT;  Surgeon: Avel Ruiz DPM;  Location: [facility];  Service: Podiatry;  Laterality: Left;    WISDOM TOOTH EXTRACTION       Family History   Problem Relation Age of Onset    Hypertension Sister     Breast cancer Neg Hx     Cancer Neg Hx     Colon cancer Neg Hx     Miscarriages / Stillbirths Neg Hx     Diabetes Neg Hx      Social History     Tobacco Use    Smoking status: Every Day     Types: Vaping with nicotine    Smokeless tobacco: Never   Substance Use Topics    Alcohol use: No    Drug use: No        Review of patient's allergies indicates:   Allergen Reactions    Banana        No current outpatient medications on file.     No current facility-administered medications for this visit.         Chart review:  Primary Care Physician: Elvin Newberry  "review:  Most Recent Data:  CBC:   Lab Results   Component Value Date    WBC 5.86 02/10/2022    HGB 12.1 02/10/2022    HCT 37.5 02/10/2022     02/10/2022    MCV 86 02/10/2022    RDW 13.9 02/10/2022     BMP:   Lab Results   Component Value Date     02/10/2022    K 3.5 02/10/2022     02/10/2022    CO2 21 (L) 02/10/2022    BUN 11 02/10/2022    CREATININE 0.7 02/10/2022    GLU 94 02/10/2022    CALCIUM 9.0 02/10/2022     LFTs:   Lab Results   Component Value Date    PROT 6.6 02/10/2022    ALBUMIN 3.5 02/10/2022    BILITOT 0.7 02/10/2022    AST 13 02/10/2022    ALKPHOS 65 02/10/2022    ALT 17 02/10/2022     Coags: No results found for: "INR", "PROTIME", "PTT"  FLP: No results found for: "CHOL", "HDL", "LDLCALC", "TRIG", "CHOLHDL"  DM:   Lab Results   Component Value Date    CREATININE 0.7 02/10/2022     Thyroid: No results found for: "TSH", "FREET4", "W7PMPRK", "H0PGFSD", "THYROIDAB"  Anemia: No results found for: "IRON", "TIBC", "FERRITIN", "GGXBSVXV87", "FOLATE"  Cardiac: No results found for: "TROPONINI", "CKTOTAL", "CKMB", "BNP"  Urinalysis:   Lab Results   Component Value Date    COLORU Yellow 02/10/2022    SPECGRAV 1.030 02/10/2022    NITRITE Negative 02/10/2022    KETONESU Negative 02/10/2022    UROBILINOGEN Negative 02/10/2022         Radiology review:      Other Results:  EKG (my interpretation): sinus tachycardia, from 2017.        Review of Systems:  Review of Systems   HENT:  Positive for ear pain.    Musculoskeletal:  Positive for back pain.   Allergic/Immunologic: Positive for environmental allergies and food allergies.       Diet Education Discussed: Recommend high protein, low carb meals- mainly meats and vegetables.  Works nights as Tech at [employer]  Wake: 430-5p  Breakfast at 9 pm: pasta with shrimp  Lunch:  skip  Dinner:  skip  Water: 40 oz  Redbull (20+ oz) & [employer] (20+ oz)- both during at work  Sprite 16 oz- 1-2/day  Regular Crangrape and Cran pineapple- daily  No coffee, " "tea  Eat more often when off  Drink with Straws      MVI:   none    Exercise: Recommend cardiovascular exercise, get HR over 100 for 20 minutes 3 times per week  Work 3x one week and 5x another      Physical:     Vital Signs (Most Recent)  Temp: 98.2 °F (36.8 °C) (02/07/24 1027)  Pulse: 92 (02/07/24 1027)  Resp: 16 (02/07/24 1027)  BP: 132/73 (02/07/24 1027)  4' 11" (1.499 m)  135.1 kg (297 lb 13.5 oz)     Body comp:  Fat Percent:  55.3 %  Fat Mass:  162.4 lb  FFM:  131.4 lb  TBW: 99 lb  TBW %:  33.7 %  BMR: 1987 kcal    Wt Readings from Last 5 Encounters:   02/07/24 135.1 kg (297 lb 13.5 oz)   12/06/23 134.8 kg (297 lb 1.6 oz)   04/27/23 124.4 kg (274 lb 4.8 oz)   03/23/22 115.7 kg (255 lb)   02/22/22 115.7 kg (255 lb)         Physical Exam:  Physical Exam  Vitals and nursing note reviewed.   Constitutional:       General: She is not in acute distress.     Appearance: Normal appearance. She is obese. She is not ill-appearing or toxic-appearing.   HENT:      Head: Normocephalic and atraumatic.      Right Ear: External ear normal.      Left Ear: External ear normal.      Nose: Nose normal. No congestion or rhinorrhea.      Mouth/Throat:      Mouth: Mucous membranes are moist.      Pharynx: Oropharynx is clear.   Eyes:      General:         Right eye: No discharge.         Left eye: No discharge.      Conjunctiva/sclera: Conjunctivae normal.   Cardiovascular:      Rate and Rhythm: Normal rate and regular rhythm.      Pulses: Normal pulses.      Heart sounds: Normal heart sounds. No murmur heard.  Pulmonary:      Effort: Pulmonary effort is normal. No respiratory distress.      Breath sounds: Normal breath sounds. No stridor.   Chest:      Chest wall: No tenderness.   Abdominal:      General: Bowel sounds are normal. There is no distension.      Palpations: There is no mass.      Tenderness: There is no abdominal tenderness. There is no guarding.      Hernia: No hernia is present.   Musculoskeletal:         General: No " swelling, tenderness, deformity or signs of injury. Normal range of motion.      Right lower leg: No edema.      Left lower leg: No edema.   Skin:     General: Skin is warm and dry.      Capillary Refill: Capillary refill takes less than 2 seconds.      Coloration: Skin is not jaundiced or pale.      Findings: No bruising, erythema or rash.   Neurological:      General: No focal deficit present.      Mental Status: She is alert and oriented to person, place, and time.      Motor: No weakness.      Gait: Gait normal.   Psychiatric:         Mood and Affect: Mood normal.         Behavior: Behavior normal.         Thought Content: Thought content normal.         Judgment: Judgment normal.     ASSESSMENT/PLAN:        1. Morbid obesity  Ambulatory referral/consult to Psychiatry    Ambulatory referral/consult to Nutrition Services    BMP    Folate Serum    H. Pylori Antibody, IGG    Hepatic Panel    Iron & TIBC    Magnesium    Phosphorus    T3    Vitamin B12    Vitamin B1    Vitamin D 25 Hydroxy    EKG    FL Upper GI      2. Weight gain        3. Encounter for vitamin deficiency screening        4. Pre-bariatric surgery nutrition evaluation        5. Encounter for pre-bariatric surgery counseling and education            Plan:  Britta Mariee has morbid obesity as their Body mass index is 60.16 kg/m². She would benefit from weight loss surgery and has chosen gastric sleeve surgery as the preferred procedure. She understands that this is a tool and lifestyle change will be necessary to keep weight off. I went over possible complications of all surgeries with the patient and she is agreeable to surgery.    She will need: 6 MSD    Labs  EKG  UGI   dietary consult  psych consult   Seminar      I will obtain the following clearances prior to surgery: none    Patient has multiple medical problems that have been worsening over time.  We are planning to treat these medical problems which include obesity with diet, weight  loss, exercise, possibly surgery.       Pregnancy: LMP 1/8/24. For health reasons, patients counseled to avoid pregnancy for two years after bariatric surgery. The patient's gynocologist should be consulted to discuss preventative measures and prior to planned pregnancy. Patient voiced understanding.      Diet plan: high protein low carb- mainly meats and vegetables  Exercise plan: Cardiovascular exercise, get HR over 100 for 20 minutes 3 times per week.  Start multivitamin-discussion of post-operative life long MVI need, including Calcium, and a B-Complex

## 2024-02-14 ENCOUNTER — CLINICAL SUPPORT (OUTPATIENT)
Dept: BARIATRICS | Facility: CLINIC | Age: 24
End: 2024-02-14
Payer: COMMERCIAL

## 2024-02-14 DIAGNOSIS — E66.01 MORBID OBESITY: ICD-10-CM

## 2024-02-14 DIAGNOSIS — Z71.3 ENCOUNTER FOR DIETARY COUNSELING AND SURVEILLANCE: Primary | ICD-10-CM

## 2024-02-14 PROCEDURE — 97802 MEDICAL NUTRITION INDIV IN: CPT | Mod: S$GLB,,,

## 2024-02-14 PROCEDURE — 99999 PR PBB SHADOW E&M-EST. PATIENT-LVL II: CPT | Mod: PBBFAC,,,

## 2024-02-15 VITALS — WEIGHT: 293 LBS | BODY MASS INDEX: 59.07 KG/M2 | HEIGHT: 59 IN

## 2024-02-15 NOTE — PROGRESS NOTES
NUTRITIONAL CONSULT    Referring Physician: Dr. Leonardo  Reason for MNT Referral: Initial assessment for sleeve gastrectomy work-up    PAST MEDICAL HISTORY:   23 y.o. female presents with a BMI of Body mass index is 59.84 kg/m²..    Past attempts at weight loss include:   Unsupervised: gym memberships  Supervised:  Keto  Diet pills: Laxatives  Exercise attempts: treadmill, walking, running     Weight history:   At current weight:  1 year  Obese for 10+ years.  More than 35 pounds overweight for 10+ years.  More than 100 pounds overweight for 5+ years.  Started dieting at 15 years old.  Maximum weight reached: 297 lb  Most weight lost was 20 lbs through walking for a few years.  She describes Her eating habits as volume, sweets, skipping meals.     MYNOR screening: none     Reflux screening: none    Past Medical History:   Diagnosis Date    Asthma        CLINICAL DATA:  23 y.o.-year-old Black or  female.  Height: 4'11  Weight: 296 lbs  IBW: 108 lbs  BMI: 59.84  The patient's goal weight (50 % EBW): 202 lbs    Goal for Bariatric Surgery: to improve health and to lose weight    NUTRITION & HEALTH HISTORY:  Greatest challenge: dining out frequency, portion control, irregular meal patterns, high-fat diet, sugary beverages, and snacking    Current diet recall:    Breakfast- skipping   Lunch (4pm) - firehouse subs (turkey, french ranch sub), steak +asparagus+ potatoes   Dinner (after 12am) - dining out: [employer], [employer]aco [employer], [employer] or Mom's leftovers   Snacks - cheez-its, hot fries, juicy bursts    Current Diet:  Meal pattern: Irregular  Protein supplements: None  Snacking: 3-4 / day  Vegetables: Likes a few. Eats 2-3 times per week.  Fruits: Likes a few. Eats 2-3 times per week.  Beverages: 20 oz x2 smart water bottles, 12oz coke every other day, 48oz redbull, 16oz monster rehab, filling victorina cup (48oz) with half cranapple or cranpineapple or koolaid jammers daily  Dining out: 3-4x, Weekly. Mostly fast  "food, restaurants, and take-out.  Cooking at home: Weekly. Mostly baked, grilled, and stove top  meat, fish, starchy CHO, and vegetables.    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   Lives with self.  Grocery shopping and food prep self. Stops at her mom's house 2-3d/wk for meals.   Patient believes the household will be supportive after surgery.  Alcohol: Daily, wine.   Smoking: Trying to quit. Decreased vaping - mainly when stress levels increase.     ASSESSMENT:  Patient reports attempts at weight loss, only to regain lost weight.  Patient demonstrated knowledge of healthy eating behaviors and exercise patterns; admits to not eating healthy and not exercising at this point.  Patient states willingness to change lifestyle and make behavior modifications.  Expect poor- fair  compliance after surgery at this time. She has minimal meal preparation for meals at this time. Unsure if mom is willing to make required dietary changes to menu.   Reports struggling with eating more often with off, lack of sleep, and increased stress levels with multiple jobs and respiratory therapy school. Reviewed the roles of sleep and stress management with weight loss. Discussed creating a schedule to assist with creating routine.   Patient reporting mouth tingling when consuming berries and kiwi. Known allergy to bananas. RD unable to find previous record for allergy testing. Allergic reaction in 03/2020 from hair dye. RD recommends considered for allergy testing.     Insurance requires medically supervised diet prior to consideration for bariatric surgery.    BARIATRIC DIET DISCUSSION:  Discussed diet after surgery and related to patients food " record.  Reviewed diet progression before and after surgery.  Reinforced that surgery is not a magic bullet and importance of low fat foods and no snacking.  Stressed importance of exercise and its role in achieving weight loss goals.  Answered all questions.  No alcohol for 6 month post operation because alcohol is a gastric irritant. Stop alcohol intake now per Dr. Leonardo.   Provided fueling well on the go.     RECOMMENDATIONS:  Patient is a potential candidate for bariatric surgery.    Needs additional visit(s) with RD.    PLAN:  Continue to review Bariatric Nutrition Guidebook at home and call with any questions.  Work on Bariatric Nutrition Checklist.  Work on expanding variety of vegetables.  Work on gradually cutting back on starchy CHO in the diet.  Begin trying various protein supplements to determine preference.  Start including protein supplements in the diet plan daily to prevent skipping meals.   Reduce frequency of dining out. Utilizing fueling well on the go options.   More grocery shopping and meal preparation at home.  Increase exercise.  Start complete MVI with 18mg iron.   Simple meal preparation such as eggs with whole wheat toast or fruit. Try tuna salad, chicken salad, or egg salad on whole grain crackers. Consider cold cuts in whole grain wrap. Try Lean Cuisine, Healthy Choice, or Atkins meals for easy preparation.   Start shopping for bariatric vitamins & minerals.  Remove soda and energy drinks. Swap juices to diet oceans cranberry and SF koolaid jammer.   Snacks are to be a protein source with vegetable such as tomato, cucumber, and mozzarella balls.  Consideration for allergy testing.   Return to clinic.    SESSION TIME:  60 minutes

## 2024-02-21 ENCOUNTER — TELEPHONE (OUTPATIENT)
Dept: PSYCHIATRY | Facility: CLINIC | Age: 24
End: 2024-02-21
Payer: COMMERCIAL

## 2024-03-05 ENCOUNTER — CLINICAL SUPPORT (OUTPATIENT)
Dept: BARIATRICS | Facility: CLINIC | Age: 24
End: 2024-03-05
Payer: COMMERCIAL

## 2024-03-05 DIAGNOSIS — Z71.9 ENCOUNTER FOR HEALTH EDUCATION: Primary | ICD-10-CM

## 2024-03-06 NOTE — PROGRESS NOTES
Bariatric Support Group     RD presented on reading the Net Carbohydrates: Sugar Alcohols, Sugar Substitutes, and Fiber.         Objectives included:      1) Understanding the role of Dietary Fiber  2) Calculating Net Carbohydrates  3) Examining Sugar Free, Sugar Alcohols, and No Sugar Added on the nutrition facts label  4) Review of Sugar Substitutes  5) Putting it all together

## 2024-03-12 ENCOUNTER — TELEPHONE (OUTPATIENT)
Dept: PSYCHIATRY | Facility: CLINIC | Age: 24
End: 2024-03-12
Payer: COMMERCIAL

## 2024-03-13 ENCOUNTER — OFFICE VISIT (OUTPATIENT)
Dept: BARIATRICS | Facility: CLINIC | Age: 24
End: 2024-03-13
Payer: COMMERCIAL

## 2024-03-13 VITALS
TEMPERATURE: 98 F | BODY MASS INDEX: 57.9 KG/M2 | RESPIRATION RATE: 16 BRPM | WEIGHT: 287.19 LBS | HEART RATE: 84 BPM | SYSTOLIC BLOOD PRESSURE: 132 MMHG | DIASTOLIC BLOOD PRESSURE: 77 MMHG | HEIGHT: 59 IN

## 2024-03-13 DIAGNOSIS — E66.01 MORBID OBESITY: Primary | ICD-10-CM

## 2024-03-13 PROCEDURE — 3078F DIAST BP <80 MM HG: CPT | Mod: CPTII,S$GLB,, | Performed by: SURGERY

## 2024-03-13 PROCEDURE — 99999 PR PBB SHADOW E&M-EST. PATIENT-LVL III: CPT | Mod: PBBFAC,,, | Performed by: SURGERY

## 2024-03-13 PROCEDURE — 1159F MED LIST DOCD IN RCRD: CPT | Mod: CPTII,S$GLB,, | Performed by: SURGERY

## 2024-03-13 PROCEDURE — 99213 OFFICE O/P EST LOW 20 MIN: CPT | Mod: S$GLB,,, | Performed by: SURGERY

## 2024-03-13 PROCEDURE — 3075F SYST BP GE 130 - 139MM HG: CPT | Mod: CPTII,S$GLB,, | Performed by: SURGERY

## 2024-03-13 PROCEDURE — 3008F BODY MASS INDEX DOCD: CPT | Mod: CPTII,S$GLB,, | Performed by: SURGERY

## 2024-03-13 NOTE — PROGRESS NOTES
Medically Supervised Weight Loss Documentation    Date of Visit: 03/13/2024    Patient: Britta Mariee    Current Weight: 287  Current BMI: Body mass index is 58.01 kg/m².  Weight Change: -10    Last Weight: 297    Beginning Weight: 297      Vitals:   Vitals:    03/13/24 1411   BP: 132/77   Pulse: 84   Resp: 16   Temp: 98.2 °F (36.8 °C)       Comorbidities:   Past Medical History:   Diagnosis Date    Asthma        Medications:  No current outpatient medications on file prior to visit.     No current facility-administered medications on file prior to visit.         Body comp:  Fat Percent:  55.6 %  Fat Mass:  159.6 lb  FFM:  127.6 lb  TBW: 96 lb  TBW %:  33.4 %  BMR: 1934 kcal      Diet Education Discussed:    Works at night    Breakfast:  skip  Lunch:  skip  Dinner:  firehouse sub, steak vegetables    No more soda  No more energy drink    Exercise/Activity Discussed:    Walking some    Behavior or Diet Goals for this patient:    Try to find protein shake  Stop skipping meals  Exercise routine when feasible     Labs  EKG  UGI   dietary consult -done  psych consult   Seminar        I will obtain the following clearances prior to surgery: none      : total time spent for visit: 20 minutes

## 2024-03-14 ENCOUNTER — OFFICE VISIT (OUTPATIENT)
Dept: PSYCHIATRY | Facility: CLINIC | Age: 24
End: 2024-03-14
Payer: COMMERCIAL

## 2024-03-14 DIAGNOSIS — E66.01 MORBID OBESITY: ICD-10-CM

## 2024-03-14 DIAGNOSIS — F17.290 NICOTINE DEPENDENCE DUE TO VAPING TOBACCO PRODUCT: ICD-10-CM

## 2024-03-14 DIAGNOSIS — Z01.818 PREOPERATIVE EVALUATION TO RULE OUT SURGICAL CONTRAINDICATION: Primary | ICD-10-CM

## 2024-03-14 PROCEDURE — 90791 PSYCH DIAGNOSTIC EVALUATION: CPT | Mod: 95,,, | Performed by: PSYCHOLOGIST

## 2024-03-14 PROCEDURE — 96131 PSYCL TST EVAL PHYS/QHP EA: CPT | Mod: 95,,, | Performed by: PSYCHOLOGIST

## 2024-03-14 PROCEDURE — 96139 PSYCL/NRPSYC TST TECH EA: CPT | Mod: 95,,, | Performed by: PSYCHOLOGIST

## 2024-03-14 PROCEDURE — 96130 PSYCL TST EVAL PHYS/QHP 1ST: CPT | Mod: 95,,, | Performed by: PSYCHOLOGIST

## 2024-03-14 PROCEDURE — 96138 PSYCL/NRPSYC TECH 1ST: CPT | Mod: 95,,, | Performed by: PSYCHOLOGIST
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
## 2024-03-20 NOTE — PSYCH TESTING
[facility]  [address] [place] [address]  [phone]    REPORT OF PSYCHOLOGICAL TESTING    NAME: Britta Mariee  MRN: [id number]  : 2000    REFERRED BY: Candace Lujan NP    REASON FOR REFERRAL:  Psychological Evaluation prior to bariatric surgery.    EVALUATED BY:  Wellington Jackson, Ph.D., Clinical Psychologist  HARRIS Fernández, Psychometrician    DATES OF EVALUATION: 2024 and 2024    EVALUATION PROCEDURES AND TIMES:  Conducted by Psychologist (2 hours):  Integration of patient data, interpretation of standardized test results and clinical data, clinical decision-making, treatment planning and report, and interactive feedback to the patient  CPT Codes:  32550 - 1 hour; 47419 - 1 hour  Conducted by Technician (1.5 hours):  Psychological test administration and scoring by technician, two or more tests, any method:  [place] Multiphasic Personality Inventory - 2 - Restructured Form (MMPI-2-RF); [facility] Behavioral Medicine Diagnostic (MBMD)  CPT Codes:  07404 - 30 minutes; 80554 - 30 minutes, 61557 - 30 minutes, 29544 - 30 minutes    EVALUATION FINDINGS:  Before this evaluation was initiated, the purposes and process of the assessment and the limits of confidentiality were discussed with the patient who expressed understanding of these issues and orally consented to proceed with the evaluation.    Ms. Mariee has struggled with weight since leaving high school (15 y/o). Factors that have contributed to her weight gain over the years include: grief and loss (best friend and uncle murdered, grandfather murdered the same year). In addition, Ms. Mariee denied being an emotional eater but reports eating late at night. She has tried many weight loss methods on her own (i.e., exercising; dieting; diabetic diet) with little success, and she believes that her biggest weight loss challenge is giving up when not making any progress. Her motivation for seeking surgery now is I  "work on cardio. I don't want to get up in age and have all these health issues and have to get these procedures done. I don't want to be unhealthy and have all these health issues as I get older, and my metabolism slows down.    She reported isolated experience of depression in response to her friend's traumatic death and self-consciousness related to her body image. She reported no history of significant psychological difficulties. She reported brief trial of hydroxyzine after her friend's death, but the drug made her "too sleepy." Otherwise, she has never taken psychotropic medication, has never been hospitalized for psychiatric reasons, and denied any history of suicidality. She denies a history of eating disorder.    At her initial consultation with Candace Lujan NP, her BMI was 60.16 kg/m². Her medical comorbidities include: none. She has met multiple times with a bariatric dietician, and reports that she has made changes to her eating habits. She was aware of details regarding the Sleeve procedure, as well as risks of the procedure and post-operative eating restrictions. She appears motivated for change at this time. She was fully cooperative and engaged in the assessment process.    MMPI-2 RF.  The MMPI-2 RF provides an assessment of personality and psychopathology with specific evaluation of psychosocial risk factors associated with outcomes of bariatric surgery. Ms. Mariee produced a valid and interpretable MMPI-2 RF profile, answering items relevantly based on content. Therefore, her responses should be considered a relatively accurate reflection of her current psychological functioning.    Ms. Mariee produced an interpretable MMPI-2 RF profile despite evidence of potential over-reporting. This type of responding may occur in individuals with substantial problems who report credible symptoms; however, in the absence of corroborating information it may reflect exaggeration or an attempt to portray great " distress.  This profile should be interpreted with some caution due to these issues.      TEST RESULTS.  Ms. Mariee reports somatic, cognitive, emotional, thought, behavioral, and interpersonal dysfunction.    Somatic.  Ms. Mariee reports vague neurological complaints and may develop physical symptoms in response to stress.  Cognitive.  She reports memory problems and poor concentration. She likely has difficulty coping with stress.  Emotional.  She reports a preoccupation with death. In the feedback session, she denied history of SI and reported her thoughts of death are related to her  friend. She reports feeling sad and dissatisfied with life circumstances. She also reports being passive and feeling indecisive. She reports she is also self-critical and guilt-prone. She also indicated anxous feelings and phobias. Testing also indicated anger-proneness.   Thought.  She reported significant thought dysfunction, including thoughts of others seekig to harm her, which may lead to interpersonal difficulties. She reports experience of thought disorganization and unusual sensory-perceptual abilities. She denied somatic delusions.  Behavioral.  She reports history of acting-out behavior and is likely to become bored. She reports heightened excitation and energy, which may reflect ila or hypomania. She reports a history of problematic behavior at school as well.  Interpersonal.  She reports cynical beliefs about others, perceiving that they look out only for themselves. This thought pattern may cause pt to feel alienated from others.    RISK ASSESSMENTS.  The following likelihoods are based on test results and research, and are correlational rather than causational.  Her profile is associated with possible depression, which is a risk factor for adverse bariatric surgery outcome.      MBMB. The MBMD is a multidimensional assessment designed to identify psychosocial factors that may support or interfere with a  patient's course of medical treatment. The categorizations below are based on credible probabilistic judgments and should not be considered definitive.    Negative Health Habits.  The following negative health habits are possible problem areas: Eating, Inactivity, and Smoking      Psychiatric Indications.  Pt's profile indicates possible guardedness, emotional lability, and depression.    Coping Styles.  Pt reports a passive and introverted lifestyle, often preferring isolation. This passive stance may result in lack of initiative in her medical treatment. Once in treatment, she may struggle to follow recommendations. Frequent follow-ups and clearly, simply written treatment plans can facilitate her adherence to treatment.    Stress Moderators.  Pt's profile indicates social isolation as a liability to her functioning and pain tolerance and spiritual anais as assets to her functioning.    Treatment Prognostics. Pt's profile indicates possible interventional fragility and information discomfort as liabilities and no reported assets.    [facility] POST-SURGERY PROBABILITIES    Patient Behavior.  Ms. Mariee's profile indicates high likelihood that she will comply with a medical regimen; average likelihood that she will refrain from engaging in unhealthy eating behavior, maintain her postsurgical weight loss, and avoid long-term health complications; and low likelihood that she will change her unhealthy habits, follow nutritional advice, and maintain an exercise program.    Postsurgical [place].  Ms. Mariee's profile indicates average likelihood that surgery will improve her overall quality of life, body image, physical health, mental outlook, sexual activity, and employment/vocational opportunities. Her profile indicates low likelihood that surgery will improve her psychosocial functioning.     Psychological factors revealed by MBMD profile are unlikely to contribute to excessive complications for this patient.        DIAGNOSTIC IMPRESSIONS:  Z68.43   Body Mass Index of 58.01  Z01.818 Pre-operative Exam  E66.01   Morbid Obesity  F17.29   Nicotine dependence due to vaping tobacco product     SUMMARY: Ms. Mariee has a long history of weight problems and is pursuing bariatric surgery at this time in an effort to improve her health and quality of life. Results of personality testing should be considered valid, and they indicate no acute psychiatric symptoms or declines in functioning at this time that would impact her surgical prognosis. Test results do not reveal any evidence that psychological difficulties would play a significant role in her recovery from surgery. She is in active nicotine dependence and uses a nicotine vape daily, which is a risk factor for adverse outcome. She was amenable to participation in tobacco cessation program, and the referral was placed. No absolute psychological contraindications were revealed by psychological testing. She is cleared for bariatric surgery from a psychological perspective.

## 2024-03-21 ENCOUNTER — PATIENT MESSAGE (OUTPATIENT)
Dept: BARIATRICS | Facility: CLINIC | Age: 24
End: 2024-03-21
Payer: COMMERCIAL

## 2024-04-15 ENCOUNTER — OFFICE VISIT (OUTPATIENT)
Dept: BARIATRICS | Facility: CLINIC | Age: 24
End: 2024-04-15
Payer: COMMERCIAL

## 2024-04-15 VITALS
RESPIRATION RATE: 16 BRPM | DIASTOLIC BLOOD PRESSURE: 77 MMHG | BODY MASS INDEX: 58.63 KG/M2 | SYSTOLIC BLOOD PRESSURE: 131 MMHG | TEMPERATURE: 98 F | WEIGHT: 290.81 LBS | HEIGHT: 59 IN | HEART RATE: 99 BPM

## 2024-04-15 DIAGNOSIS — F17.290 NICOTINE DEPENDENCE DUE TO VAPING TOBACCO PRODUCT: ICD-10-CM

## 2024-04-15 DIAGNOSIS — E66.01 MORBID OBESITY: Primary | ICD-10-CM

## 2024-04-15 PROCEDURE — 3008F BODY MASS INDEX DOCD: CPT | Mod: CPTII,S$GLB,, | Performed by: NURSE PRACTITIONER

## 2024-04-15 PROCEDURE — 1159F MED LIST DOCD IN RCRD: CPT | Mod: CPTII,S$GLB,, | Performed by: NURSE PRACTITIONER

## 2024-04-15 PROCEDURE — 1160F RVW MEDS BY RX/DR IN RCRD: CPT | Mod: CPTII,S$GLB,, | Performed by: NURSE PRACTITIONER

## 2024-04-15 PROCEDURE — 3075F SYST BP GE 130 - 139MM HG: CPT | Mod: CPTII,S$GLB,, | Performed by: NURSE PRACTITIONER

## 2024-04-15 PROCEDURE — 3078F DIAST BP <80 MM HG: CPT | Mod: CPTII,S$GLB,, | Performed by: NURSE PRACTITIONER

## 2024-04-15 PROCEDURE — 99999 PR PBB SHADOW E&M-EST. PATIENT-LVL III: CPT | Mod: PBBFAC,,, | Performed by: NURSE PRACTITIONER

## 2024-04-15 PROCEDURE — 99213 OFFICE O/P EST LOW 20 MIN: CPT | Mod: S$GLB,,, | Performed by: NURSE PRACTITIONER

## 2024-04-15 NOTE — PROGRESS NOTES
Medically Supervised Weight Loss Documentation    Date of Visit: 04/15/2024    Patient: Britta Mariee    Current Weight: 290  Current BMI: Body mass index is 58.73 kg/m².  Weight Change: -7    Last Weight: 287    Beginning Weight: 297      Vitals:   Vitals:    04/15/24 1622   BP: 131/77   Pulse: 99   Resp: 16   Temp: 98.1 °F (36.7 °C)       Comorbidities:   Past Medical History:   Diagnosis Date    Asthma        Medications:  No current outpatient medications on file prior to visit.     No current facility-administered medications on file prior to visit.         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, quesodilla   Lunch 9-10 pm: pasta, out to eat, skip  Dinner:  firehouse sub, steak vegetables  No more soda  Started back with energy drinks ([facility])  High stress with sister living in house, now wanting her out    Exercise/Activity Discussed:  Walking some at work    Behavior or Diet Goals for this patient:  Tanita scale Reviewed  Increase 3 lb muscle  Try to find protein shake  Stop skipping meals  Track foods  Exercise routine when feasible   Get preoperative clearances- issue with fees and waiting to get paid  Pregnancy Prevention: Pt aware that is it not recommended to become pregnant for 18 month after bariatric surgery. She will be tested for pregnancy on the day of surgery. Contraceptives should be used. Contact OB/GYN for various methods and recommendations.        Labs  EKG  UGI   dietary consult -done  psych consult   Seminar        I will obtain the following clearances prior to surgery: none      : total time spent for visit: 20 minutes

## 2024-05-07 ENCOUNTER — OFFICE VISIT (OUTPATIENT)
Dept: BARIATRICS | Facility: CLINIC | Age: 24
End: 2024-05-07
Payer: COMMERCIAL

## 2024-05-07 VITALS
TEMPERATURE: 98 F | BODY MASS INDEX: 59.07 KG/M2 | WEIGHT: 293 LBS | SYSTOLIC BLOOD PRESSURE: 139 MMHG | HEART RATE: 98 BPM | DIASTOLIC BLOOD PRESSURE: 82 MMHG | HEIGHT: 59 IN | RESPIRATION RATE: 16 BRPM

## 2024-05-07 DIAGNOSIS — Z71.3 ENCOUNTER FOR DIETARY COUNSELING AND SURVEILLANCE: ICD-10-CM

## 2024-05-07 DIAGNOSIS — E66.01 MORBID OBESITY: Primary | ICD-10-CM

## 2024-05-07 PROCEDURE — 99213 OFFICE O/P EST LOW 20 MIN: CPT | Mod: S$GLB,,, | Performed by: NURSE PRACTITIONER

## 2024-05-07 PROCEDURE — 1160F RVW MEDS BY RX/DR IN RCRD: CPT | Mod: CPTII,S$GLB,, | Performed by: NURSE PRACTITIONER

## 2024-05-07 PROCEDURE — 99999 PR PBB SHADOW E&M-EST. PATIENT-LVL III: CPT | Mod: PBBFAC,,, | Performed by: NURSE PRACTITIONER

## 2024-05-07 PROCEDURE — 3079F DIAST BP 80-89 MM HG: CPT | Mod: CPTII,S$GLB,, | Performed by: NURSE PRACTITIONER

## 2024-05-07 PROCEDURE — 3075F SYST BP GE 130 - 139MM HG: CPT | Mod: CPTII,S$GLB,, | Performed by: NURSE PRACTITIONER

## 2024-05-07 PROCEDURE — 3008F BODY MASS INDEX DOCD: CPT | Mod: CPTII,S$GLB,, | Performed by: NURSE PRACTITIONER

## 2024-05-07 PROCEDURE — 1159F MED LIST DOCD IN RCRD: CPT | Mod: CPTII,S$GLB,, | Performed by: NURSE PRACTITIONER

## 2024-05-07 NOTE — PROGRESS NOTES
Medically Supervised Weight Loss Documentation    Date of Visit: 05/07/2024    Patient: Britta Mariee  Beginning Weight: 297  Last Weight: 290  Current Weight: 295      Current BMI: Body mass index is 59.58 kg/m².  Weight Change: -2          Vitals:   Vitals:    05/07/24 1120   BP: 139/82   Pulse: 98   Resp: 16   Temp: 98.3 °F (36.8 °C)       Comorbidities:   Past Medical History:   Diagnosis Date    Asthma        Medications:  No current outpatient medications on file prior to visit.     No current facility-administered medications on file prior to visit.         Body comp:  Fat Percent:  55.0 %  Fat Mass:  162.2 lb  FFM:  132.8 lb  TBW: 100 lb  TBW %:  33.7 %  BMR: 1968 kcal      Diet Education Discussed:  Works at night, going to School  Breakfast- noon: chicken and yellow rice   Lunch 9-10 pm: red beans and rice  Dinner:  steak and ribs with vegetables or salad  Water; minimal  No more soda  No longer on energy drinks  Decreased stress at home  Found protein drink Fairlife Core with strawberry, vanilla  Using straws    Exercise/Activity Discussed:  Walking some at work- doing laps around unit (20 laps)    MVI:  One per day from [facility] Records        Behavior or Diet Goals for this patient:  Tanita scale Reviewed  Increase 2 lb muscle  Increased 2 lb water  Diet- currently with fair compliance  Substitute protein shake for one meal  Do not have more than 1 carb per day  Increase water  Track foods  Stop using straws  Not  food/water at this time  Exercise routine when feasible   Get preoperative clearances-  issue with fees and waiting to get paid  Discussed Out of pocket cost- placed for financial appt  Pregnancy Prevention: LMP 5/1/24- Pt aware that is it not recommended to become pregnant for 18 month after bariatric surgery. She will be tested for pregnancy on the day of surgery. Contraceptives should be used. Contact OB/GYN for various methods and recommendations.        Labs  EKG  UGI   dietary  consult -done  psych consult   Seminar        I will obtain the following clearances prior to surgery: none      : total time spent for visit: 20 minutes

## 2024-05-09 DIAGNOSIS — F17.290 NICOTINE DEPENDENCE DUE TO VAPING TOBACCO PRODUCT: Primary | ICD-10-CM

## 2024-05-16 ENCOUNTER — TELEPHONE (OUTPATIENT)
Dept: BARIATRICS | Facility: CLINIC | Age: 24
End: 2024-05-16
Payer: COMMERCIAL

## 2024-05-16 NOTE — TELEPHONE ENCOUNTER
----- Message from Corrine Morton sent at 5/16/2024  4:07 PM CDT -----  Pt is calling to see if she can reschedule her appt that is on 6/4 to possibly 6/3. Please call back to advise, thank you    [phone]

## 2024-05-27 ENCOUNTER — LAB VISIT (OUTPATIENT)
Dept: LAB | Facility: HOSPITAL | Age: 24
End: 2024-05-27
Attending: NURSE PRACTITIONER
Payer: COMMERCIAL

## 2024-05-27 DIAGNOSIS — E66.01 MORBID OBESITY: ICD-10-CM

## 2024-05-27 DIAGNOSIS — F17.290 NICOTINE DEPENDENCE DUE TO VAPING TOBACCO PRODUCT: ICD-10-CM

## 2024-05-27 LAB
25(OH)D3+25(OH)D2 SERPL-MCNC: 9 NG/ML (ref 30–96)
ALBUMIN SERPL BCP-MCNC: 3.4 G/DL (ref 3.5–5.2)
ALP SERPL-CCNC: 73 U/L (ref 55–135)
ALT SERPL W/O P-5'-P-CCNC: 20 U/L (ref 10–44)
ANION GAP SERPL CALC-SCNC: 11 MMOL/L (ref 8–16)
AST SERPL-CCNC: 11 U/L (ref 10–40)
BILIRUB DIRECT SERPL-MCNC: 0.1 MG/DL (ref 0.1–0.3)
BILIRUB SERPL-MCNC: 0.2 MG/DL (ref 0.1–1)
BUN SERPL-MCNC: 10 MG/DL (ref 6–20)
CALCIUM SERPL-MCNC: 9.3 MG/DL (ref 8.7–10.5)
CHLORIDE SERPL-SCNC: 109 MMOL/L (ref 95–110)
CO2 SERPL-SCNC: 18 MMOL/L (ref 23–29)
CREAT SERPL-MCNC: 0.9 MG/DL (ref 0.5–1.4)
EST. GFR  (NO RACE VARIABLE): >60 ML/MIN/1.73 M^2
FOLATE SERPL-MCNC: 4.8 NG/ML (ref 4–24)
GLUCOSE SERPL-MCNC: 111 MG/DL (ref 70–110)
IRON SERPL-MCNC: 112 UG/DL (ref 30–160)
MAGNESIUM SERPL-MCNC: 1.8 MG/DL (ref 1.6–2.6)
PHOSPHATE SERPL-MCNC: 2.1 MG/DL (ref 2.7–4.5)
POTASSIUM SERPL-SCNC: 3.9 MMOL/L (ref 3.5–5.1)
PROT SERPL-MCNC: 7 G/DL (ref 6–8.4)
SATURATED IRON: 29 % (ref 20–50)
SODIUM SERPL-SCNC: 138 MMOL/L (ref 136–145)
TOTAL IRON BINDING CAPACITY: 388 UG/DL (ref 250–450)
TRANSFERRIN SERPL-MCNC: 277 MG/DL (ref 200–375)
VIT B12 SERPL-MCNC: 346 PG/ML (ref 210–950)

## 2024-05-27 PROCEDURE — 82306 VITAMIN D 25 HYDROXY: CPT | Performed by: NURSE PRACTITIONER

## 2024-05-27 PROCEDURE — 84425 ASSAY OF VITAMIN B-1: CPT | Performed by: NURSE PRACTITIONER

## 2024-05-27 PROCEDURE — 80048 BASIC METABOLIC PNL TOTAL CA: CPT | Performed by: NURSE PRACTITIONER

## 2024-05-27 PROCEDURE — 84480 ASSAY TRIIODOTHYRONINE (T3): CPT | Performed by: NURSE PRACTITIONER

## 2024-05-27 PROCEDURE — 83735 ASSAY OF MAGNESIUM: CPT | Performed by: NURSE PRACTITIONER

## 2024-05-27 PROCEDURE — 86677 HELICOBACTER PYLORI ANTIBODY: CPT | Performed by: NURSE PRACTITIONER

## 2024-05-27 PROCEDURE — 83540 ASSAY OF IRON: CPT | Performed by: NURSE PRACTITIONER

## 2024-05-27 PROCEDURE — 82746 ASSAY OF FOLIC ACID SERUM: CPT | Performed by: NURSE PRACTITIONER

## 2024-05-27 PROCEDURE — 80323 ALKALOIDS NOS: CPT | Performed by: NURSE PRACTITIONER

## 2024-05-27 PROCEDURE — 84100 ASSAY OF PHOSPHORUS: CPT | Performed by: NURSE PRACTITIONER

## 2024-05-27 PROCEDURE — 82607 VITAMIN B-12: CPT | Performed by: NURSE PRACTITIONER

## 2024-05-27 PROCEDURE — 80076 HEPATIC FUNCTION PANEL: CPT | Performed by: NURSE PRACTITIONER

## 2024-05-27 PROCEDURE — 36415 COLL VENOUS BLD VENIPUNCTURE: CPT | Performed by: NURSE PRACTITIONER

## 2024-05-28 DIAGNOSIS — R79.89 LOW VITAMIN D LEVEL: Primary | ICD-10-CM

## 2024-05-28 RX ORDER — ERGOCALCIFEROL 1.25 MG/1
50000 CAPSULE ORAL
Qty: 12 CAPSULE | Refills: 0 | Status: SHIPPED | OUTPATIENT
Start: 2024-05-28 | End: 2024-06-06 | Stop reason: SDUPTHER

## 2024-05-29 ENCOUNTER — PATIENT MESSAGE (OUTPATIENT)
Dept: BARIATRICS | Facility: CLINIC | Age: 24
End: 2024-05-29
Payer: COMMERCIAL

## 2024-05-29 LAB — T3 SERPL IA-MCNC: 81 NG/DL (ref 80–200)

## 2024-05-30 ENCOUNTER — PATIENT MESSAGE (OUTPATIENT)
Dept: FAMILY MEDICINE | Facility: CLINIC | Age: 24
End: 2024-05-30
Payer: COMMERCIAL

## 2024-05-30 LAB
COTININE SERPL-MCNC: 132 NG/ML
NICOTINE SERPL-MCNC: 7.2 NG/ML

## 2024-05-31 LAB
H PYLORI IGG SERPL QL IA: NEGATIVE
VIT B1 BLD-MCNC: 57 UG/L (ref 38–122)

## 2024-06-05 ENCOUNTER — CLINICAL SUPPORT (OUTPATIENT)
Dept: BARIATRICS | Facility: CLINIC | Age: 24
End: 2024-06-05
Payer: COMMERCIAL

## 2024-06-05 VITALS — WEIGHT: 293 LBS | HEIGHT: 59 IN | BODY MASS INDEX: 59.07 KG/M2

## 2024-06-05 DIAGNOSIS — Z71.3 ENCOUNTER FOR DIETARY COUNSELING AND SURVEILLANCE: Primary | ICD-10-CM

## 2024-06-05 DIAGNOSIS — E66.01 MORBID OBESITY: ICD-10-CM

## 2024-06-05 DIAGNOSIS — R63.5 WEIGHT GAIN: ICD-10-CM

## 2024-06-05 PROCEDURE — 99999 PR PBB SHADOW E&M-EST. PATIENT-LVL II: CPT | Mod: PBBFAC,,,

## 2024-06-05 PROCEDURE — 97803 MED NUTRITION INDIV SUBSEQ: CPT | Mod: S$GLB,,,

## 2024-06-05 NOTE — PATIENT INSTRUCTIONS
Goals:  Incorporate 3 meals daily with protein source to meet 60g/d.   Remove alcohol, energy drinks, and vaping. .  Increase fluids to meet 64oz/d.   Stop dining out. Transition to simple meal preparation such as Lean Cuisine or Healthy Choice, turkey/cheese roll ups, or bagged salad with rotisserie chicken and fruit.   Remove candy.   Begin portioning fruit to match serving size in nutrition booklet.   Swap snacks for vegetable paired with protein source such as cheesestick, nuts, or greek yogurt for snack.

## 2024-06-05 NOTE — PROGRESS NOTES
NUTRITION NOTE    Referring Physician: Dr. Leonardo  Reason for MNT Referral: Medically Supervised Diet pending Gastric Sleeve    PAST MEDICAL HISTORY:    Patient presents for 5/6 visit for MSD with weight gain over the past month; total weight loss by making numerous dietary and lifestyle changes.    Past Medical History:   Diagnosis Date    Asthma        CLINICAL DATA:  24 y.o. female.    There were no vitals filed for this visit.    Current Weight: 302 lbs  Weight Change Since Initial Visit: +5 lbs  Ideal Body Weight: 108 lbs  BMI: 61.07    CURRENT DIET:  Regular diet.  Diet Recall: Food records are not present.    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  Protein Supplements: 1 per day.  Snacking: Adequate. Snacks include healthy choices and sweets.    Vegetables: Likes a few. Eats 2-3 times per week.  Fruits: Likes a few. Eats 2-3 times per week.  Beverages: 20 oz x2 smart water bottles, 24oz redbull, filling victorina cup (40oz) with half cranapple or cranpineapple or zero sugar koolaid jammers at times. No straws. Removed soda.   Dining out: 1-2x, Weekly. Mostly fast food, restaurants, and take-out.  Cooking at home: Weekly. Mostly baked, grilled, and stove top  meat, fish, starchy CHO, and vegetables.    CURRENT EXERCISE:  Walking laps around unit ~ 20 laps per shift. Stopped for 4 days d/t coworker being out of town.     Vitamins / Minerals / Herbs: One-a-day from [employer]. 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 Previously working 2 jobs with school.   Alcohol:  Every other day, 2-3 glasses red wine .  Smoking:  Vaping  with increased stress. Reports starting to decrease now that she is only working 1 job.     ASSESSMENT:  Patient demonstrates some willingness to change lifestyle habits as evidenced by good exercise, including protein drinks, reduced dining out, and removal of straws and protein shakes .    Doing poorly with working on greatest challenges (dining out frequency, portion control, irregular meal patterns, high-fat diet, sugary beverages, and snacking ).    Excess calorie intake.  Inadequate protein intake.    She has made minimal lifestyle changes since her initial assessment. Must reach goal of 60g protein daily which is met with eating protein source at three meals daily. Must drink 64 ounces of sugar free, non-carbonated, non-caffeinated fluids daily. Reviewed importance of removing energy drinks. No alcohol for 6 month post operation because alcohol is a gastric irritant. Stop alcohol intake now. No vaping. Discussion of being nicotine free for 8 weeks before surgery. Updated labs will be drawn. Encouraged picking up vitamin D prescription to begin getting labs within normal range. Reiterated importance of decreasing dining out due to higher fat, sodium, and caloric meals. Discussion of dumping syndrome occurring with greater than 15g carbohydrate per meal. Reviewed simple meal preparation to begin implementing 3 meals/d. Reviewed pre- and post-operative diet progression. Unable to provide full recall, reporting lots of water, 2 protein shakes, protein and carbs, which is not our preoperative diet Answered all questions.     Patient is not a good candidate for bariatric surgery at this time. She must make radical lifestyle changes including initiating more of her own food preparation to ensure adequate adherence to guidelines or convince mother to make significant dietary changes and adhere to them long term. If patient can be observed to make and maintain these significant dietary changes than she may be  reassessed to be a potential candidate for bariatric surgery.     PLAN:    Diet: Adjust diet plan.    Exercise: Increase by parking further out, taking stairs, or increasing distance. Improve consistency.     Behavior Modification: Continue to document food & activity logs daily. Incorporate 3 meals daily with protein source to meet 60g/d. Remove alcohol and energy drinks. Increase fluids to meet 64oz/d. Stop dining out. Transition to simple meal preparation such as Lean Cuisine or Healthy Choice, turkey/cheese roll ups, or bagged salad with rotisserie chicken and fruit. Remove candy. Begin portioning fruit to match serving size in nutrition booklet. Swap snacks for vegetable paired with protein source such as cheesestick, nuts, or greek yogurt for snack. Remove vaping.     Begin vitamin D supplement.    Recommend follow-up labs for nicotine.     Weight loss prior to surgery (5-10% TBW): +5 lbs    Return to clinic in one month.    SESSION TIME:  60 minutes

## 2024-06-06 ENCOUNTER — PATIENT MESSAGE (OUTPATIENT)
Dept: BARIATRICS | Facility: CLINIC | Age: 24
End: 2024-06-06
Payer: COMMERCIAL

## 2024-06-06 ENCOUNTER — OFFICE VISIT (OUTPATIENT)
Dept: FAMILY MEDICINE | Facility: CLINIC | Age: 24
End: 2024-06-06
Payer: COMMERCIAL

## 2024-06-06 VITALS
HEART RATE: 80 BPM | SYSTOLIC BLOOD PRESSURE: 120 MMHG | TEMPERATURE: 98 F | WEIGHT: 293 LBS | RESPIRATION RATE: 20 BRPM | BODY MASS INDEX: 59.07 KG/M2 | DIASTOLIC BLOOD PRESSURE: 88 MMHG | HEIGHT: 59 IN

## 2024-06-06 DIAGNOSIS — F17.290 NICOTINE DEPENDENCE DUE TO VAPING TOBACCO PRODUCT: ICD-10-CM

## 2024-06-06 DIAGNOSIS — G47.33 OBSTRUCTIVE SLEEP APNEA SYNDROME: Primary | ICD-10-CM

## 2024-06-06 DIAGNOSIS — Z00.00 HEALTHCARE MAINTENANCE: ICD-10-CM

## 2024-06-06 DIAGNOSIS — E66.01 CLASS 3 SEVERE OBESITY WITH BODY MASS INDEX (BMI) OF 50.0 TO 59.9 IN ADULT, UNSPECIFIED OBESITY TYPE, UNSPECIFIED WHETHER SERIOUS COMORBIDITY PRESENT: ICD-10-CM

## 2024-06-06 DIAGNOSIS — R79.89 LOW VITAMIN D LEVEL: ICD-10-CM

## 2024-06-06 DIAGNOSIS — E55.9 VITAMIN D DEFICIENCY: ICD-10-CM

## 2024-06-06 PROBLEM — E66.813 CLASS 3 SEVERE OBESITY WITH BODY MASS INDEX (BMI) OF 50.0 TO 59.9 IN ADULT: Status: ACTIVE | Noted: 2024-06-06

## 2024-06-06 PROCEDURE — 99214 OFFICE O/P EST MOD 30 MIN: CPT | Mod: S$GLB,,, | Performed by: NURSE PRACTITIONER

## 2024-06-06 PROCEDURE — 3074F SYST BP LT 130 MM HG: CPT | Mod: CPTII,S$GLB,, | Performed by: NURSE PRACTITIONER

## 2024-06-06 PROCEDURE — 1159F MED LIST DOCD IN RCRD: CPT | Mod: CPTII,S$GLB,, | Performed by: NURSE PRACTITIONER

## 2024-06-06 PROCEDURE — 3079F DIAST BP 80-89 MM HG: CPT | Mod: CPTII,S$GLB,, | Performed by: NURSE PRACTITIONER

## 2024-06-06 PROCEDURE — 99999 PR PBB SHADOW E&M-EST. PATIENT-LVL IV: CPT | Mod: PBBFAC,,, | Performed by: NURSE PRACTITIONER

## 2024-06-06 PROCEDURE — 3008F BODY MASS INDEX DOCD: CPT | Mod: CPTII,S$GLB,, | Performed by: NURSE PRACTITIONER

## 2024-06-06 RX ORDER — ERGOCALCIFEROL 1.25 MG/1
50000 CAPSULE ORAL
Qty: 12 CAPSULE | Refills: 0 | Status: SHIPPED | OUTPATIENT
Start: 2024-06-06

## 2024-06-06 NOTE — PROGRESS NOTES
Patient ID: Britta Mariee is a 24 y.o. female.    Chief Complaint: Sleep Apnea (23 yo female here requesting referral to have a sleep study test due to snoring  with dry mouth daily. KM)    Ms. Mariee presents today for referral to pulmonology to evaluate sleep apnea. She states she is currently undergoing workup to have bariatric surgery. She states she has been having issues sleeping and excessive daytime fatigue. She states this Is beginning to affect her every day life.  She states she has one more appointment in the coming weeks and she will be approved. We discussed smoking cessation as well. She denies any other issues or complaints.         Past Medical History:   Diagnosis Date    Asthma      Past Surgical History:   Procedure Laterality Date    OPEN REDUCTION AND INTERNAL FIXATION (ORIF) OF INJURY OF FOOT Left 2/10/2022    Procedure: ORIF, FOOT;  Surgeon: Avel Ruiz DPM;  [facility]: [facility];  Service: Podiatry;  Laterality: Left;    WISDOM TOOTH EXTRACTION           Tobacco History:  reports that she has quit smoking. Her smoking use included vaping with nicotine. She has been exposed to tobacco smoke. She has never used smokeless tobacco.      Review of patient's allergies indicates:   Allergen Reactions    Banana        Current Outpatient Medications:     ergocalciferol (ERGOCALCIFEROL) 50,000 unit Cap, Take 1 capsule (50,000 Units total) by mouth every 7 days., Disp: 12 capsule, Rfl: 0    Review of Systems   Constitutional:  Positive for activity change, fatigue and unexpected weight change. Negative for appetite change and fever.   HENT:  Negative for congestion, mouth sores, nosebleeds, postnasal drip, rhinorrhea, sinus pressure, sinus pain, sneezing, sore throat and trouble swallowing.    Eyes:  Negative for pain, redness and itching.   Respiratory:  Negative for chest tightness and shortness of breath.    Cardiovascular:  Negative for chest pain and palpitations.   Gastrointestinal:   "Negative for abdominal pain, blood in stool, constipation, diarrhea, nausea and vomiting.   Endocrine: Negative for cold intolerance, heat intolerance, polydipsia, polyphagia and polyuria.   Genitourinary:  Negative for difficulty urinating, dysuria, flank pain, frequency, genital sores, menstrual problem, urgency, vaginal bleeding and vaginal discharge.   Musculoskeletal:  Positive for arthralgias and back pain. Negative for myalgias.   Skin:  Negative for rash and wound.   Allergic/Immunologic: Negative for environmental allergies and food allergies.   Neurological:  Negative for dizziness, light-headedness and headaches.   Hematological:  Negative for adenopathy. Does not bruise/bleed easily.   Psychiatric/Behavioral:  Positive for dysphoric mood. Negative for agitation, confusion, hallucinations, self-injury and sleep disturbance. The patient is not nervous/anxious.           Objective:      Vitals:    06/06/24 0933 06/06/24 1134   BP: (!) 120/90 120/88   Pulse: 80    Resp: 20    Temp: 98.1 °F (36.7 °C)    TempSrc: Oral    Weight: (!) 137.4 kg (302 lb 14.4 oz)    Height: 4' 11.02" (1.499 m)      Physical Exam  Constitutional:       Appearance: She is morbidly obese.      Comments: BMI 61.15 kg   HENT:      Nose: Nose normal.      Mouth/Throat:      Mouth: Mucous membranes are moist.   Cardiovascular:      Rate and Rhythm: Normal rate and regular rhythm.   Pulmonary:      Effort: Pulmonary effort is normal.      Breath sounds: Normal breath sounds.   Abdominal:      General: Bowel sounds are normal.      Palpations: Abdomen is soft.   Musculoskeletal:         General: Normal range of motion.      Cervical back: Normal range of motion.   Skin:     General: Skin is warm.   Neurological:      Mental Status: She is alert and oriented to person, place, and time. Mental status is at baseline.   Psychiatric:         Mood and Affect: Mood normal.           Assessment:       1. Obstructive sleep apnea syndrome    2. " Nicotine dependence due to vaping tobacco product    3. Healthcare maintenance    4. Class 3 severe obesity with body mass index (BMI) of 50.0 to 59.9 in adult, unspecified obesity type, unspecified whether serious comorbidity present    5. Vitamin D deficiency    6. Low vitamin D level           Plan:       Obstructive sleep apnea syndrome  -     Ambulatory referral/consult to Pulmonology; Future; Expected date: 06/13/2024    Nicotine dependence due to vaping tobacco product    Healthcare maintenance  -     LIPID PANEL; Future; Expected date: 06/06/2024  -     HEPATITIS C ANTIBODY; Future; Expected date: 06/06/2024  -     HIV 1/2 Ag/Ab (4th Gen); Future; Expected date: 06/06/2024    Class 3 severe obesity with body mass index (BMI) of 50.0 to 59.9 in adult, unspecified obesity type, unspecified whether serious comorbidity present    Vitamin D deficiency    Low vitamin D level  -     ergocalciferol (ERGOCALCIFEROL) 50,000 unit Cap; Take 1 capsule (50,000 Units total) by mouth every 7 days.  Dispense: 12 capsule; Refill: 0    Counseled on heart healthy diet rich in fiber, fresh vegetables and fruit and low in saturated fats (fried foods, red meat, etc.).  I also recommend regular exercise including a minimum of 150 minutes of cardio exercise per week and 20-30 minute workouts of strength training like light weights, yoga, pilates, etc.      Follow up in about 3 months (around 9/6/2024), or if symptoms worsen or fail to improve.        6/6/2024 Rhonda Oconnor NP

## 2024-06-10 ENCOUNTER — PATIENT MESSAGE (OUTPATIENT)
Dept: FAMILY MEDICINE | Facility: CLINIC | Age: 24
End: 2024-06-10
Payer: COMMERCIAL

## 2024-06-11 ENCOUNTER — TELEPHONE (OUTPATIENT)
Dept: BARIATRICS | Facility: CLINIC | Age: 24
End: 2024-06-11
Payer: COMMERCIAL

## 2024-06-11 NOTE — TELEPHONE ENCOUNTER
Spoke with patient, rash for 3-4 days, started Vit D same timeframe. Hold Vitamind D for 4 days. Then take OTC Vit D daily      ----- Message from Margarita Mane MA sent at 6/11/2024 10:05 AM CDT -----  Contact: pt    ----- Message -----  From: Joellen Weaver  Sent: 6/11/2024   9:22 AM CDT  To: Tai HERRERA Staff    Type:  Needs Medical Advice    Who Called: pt    Symptoms (please be specific):  rash on stomach, chest, and fact     How long has patient had these symptoms:  3 days ago    Pharmacy name and phone #:    [id number] - [place], [place] - [address] & [address] [address]  Phone: [phone] Fax: [phone]      Would the patient rather a call back or a response via [facility]? Call back    Best Call Back Number: [phone]    Additional Information: pt sts is just itchy but no trouble breathing. However the rash seems to be spreading.    Started after she started taking the Vit D    Please call to advise  Thanks

## 2024-06-12 DIAGNOSIS — R21 SKIN RASH: Primary | ICD-10-CM

## 2024-06-12 DIAGNOSIS — L29.9 PRURITUS: ICD-10-CM

## 2024-06-12 RX ORDER — TRIAMCINOLONE ACETONIDE 1 MG/G
CREAM TOPICAL 2 TIMES DAILY
Qty: 80 G | Refills: 0 | Status: SHIPPED | OUTPATIENT
Start: 2024-06-12

## 2024-06-12 RX ORDER — HYDROXYZINE PAMOATE 25 MG/1
25 CAPSULE ORAL EVERY 6 HOURS PRN
Qty: 30 CAPSULE | Refills: 0 | Status: SHIPPED | OUTPATIENT
Start: 2024-06-12

## 2024-07-01 ENCOUNTER — OFFICE VISIT (OUTPATIENT)
Dept: BARIATRICS | Facility: CLINIC | Age: 24
End: 2024-07-01
Payer: COMMERCIAL

## 2024-07-01 ENCOUNTER — PATIENT MESSAGE (OUTPATIENT)
Dept: BARIATRICS | Facility: CLINIC | Age: 24
End: 2024-07-01

## 2024-07-01 VITALS
HEIGHT: 59 IN | SYSTOLIC BLOOD PRESSURE: 141 MMHG | HEART RATE: 116 BPM | WEIGHT: 293 LBS | BODY MASS INDEX: 59.07 KG/M2 | DIASTOLIC BLOOD PRESSURE: 81 MMHG | TEMPERATURE: 99 F | RESPIRATION RATE: 16 BRPM

## 2024-07-01 DIAGNOSIS — E66.01 MORBID OBESITY: Primary | ICD-10-CM

## 2024-07-01 DIAGNOSIS — F17.290 NICOTINE DEPENDENCE DUE TO VAPING TOBACCO PRODUCT: ICD-10-CM

## 2024-07-01 PROCEDURE — 3079F DIAST BP 80-89 MM HG: CPT | Mod: CPTII,S$GLB,, | Performed by: NURSE PRACTITIONER

## 2024-07-01 PROCEDURE — 1160F RVW MEDS BY RX/DR IN RCRD: CPT | Mod: CPTII,S$GLB,, | Performed by: NURSE PRACTITIONER

## 2024-07-01 PROCEDURE — 1159F MED LIST DOCD IN RCRD: CPT | Mod: CPTII,S$GLB,, | Performed by: NURSE PRACTITIONER

## 2024-07-01 PROCEDURE — 99999 PR PBB SHADOW E&M-EST. PATIENT-LVL III: CPT | Mod: PBBFAC,,, | Performed by: NURSE PRACTITIONER

## 2024-07-01 PROCEDURE — 99214 OFFICE O/P EST MOD 30 MIN: CPT | Mod: S$GLB,,, | Performed by: NURSE PRACTITIONER

## 2024-07-01 PROCEDURE — 3008F BODY MASS INDEX DOCD: CPT | Mod: CPTII,S$GLB,, | Performed by: NURSE PRACTITIONER

## 2024-07-01 PROCEDURE — 3077F SYST BP >= 140 MM HG: CPT | Mod: CPTII,S$GLB,, | Performed by: NURSE PRACTITIONER

## 2024-07-01 NOTE — PROGRESS NOTES
Medically Supervised Weight Loss Documentation    Date of Visit: 07/01/2024    Patient: Britta Mariee    Beginning Weight: 297    Last Weight: 295    Current Weight: 304      Current BMI: Body mass index is 61.44 kg/m².  Weight Change: +8 lbs          Vitals:   Vitals:    07/01/24 1110   BP: (!) 141/81   Pulse: (!) 116   Resp: 16   Temp: 99 °F (37.2 °C)       Comorbidities:   Past Medical History:   Diagnosis Date    Asthma        Medications:  Current Outpatient Medications on File Prior to Visit   Medication Sig Dispense Refill    ergocalciferol (ERGOCALCIFEROL) 50,000 unit Cap Take 1 capsule (50,000 Units total) by mouth every 7 days. 12 capsule 0    hydrOXYzine pamoate (VISTARIL) 25 MG Cap Take 1 capsule (25 mg total) by mouth every 6 (six) hours as needed (itching). 30 capsule 0    triamcinolone acetonide 0.1% (KENALOG) 0.1 % cream Apply topically 2 (two) times daily. 80 g 0     No current facility-administered medications on file prior to visit.         Body comp:  Fat Percent:  55.0 %  Fat Mass:  168.8 lb  FFM:  135.4 lb  TBW: 102 lb  TBW %:  33.6 %  BMR: 2045 kcal      Diet Education Discussed:  Works at night, going to School  Breakfast- noon: chicken and yellow rice   Lunch 9-10 pm: red beans and rice  Dinner:  steak and ribs with vegetables or salad  Water; minimal  No more soda  No longer on energy drinks  Decreased stress at home  Found protein drink [employer] with strawberry, vanilla  Using straws    Exercise/Activity Discussed:  Walking some at work- doing laps around unit (20 laps)  Recent death in family     MVI:  One per day from [employer]      Behavior or Diet Goals for this patient:  Tanita scale Reviewed  Increase 4 lb fat  Increase 3 lb muscle  Increased 2 lb water  Diet- currently with fair compliance  Substitute protein shake for one meal  Do not have more than 1 carb per day  Increase water  Track foods  Stop using straws  Not  food/water at this time  Discussed weight  gain  No Smoking since 6/6/24  Exercise routine when feasible   Get preoperative clearances-  issue with fees and waiting to get paid  Discussed Out of pocket cost- placed for financial appt  Pregnancy Prevention: LMP June- Pt aware that is it not recommended to become pregnant for 18 month after bariatric surgery. She will be tested for pregnancy on the day of surgery. Contraceptives should be used. Contact OB/GYN for various methods and recommendations.        Labs  EKG  UGI- needs  dietary consult -done  psych consult- done  Seminar        I will obtain the following clearances prior to surgery: none      Discussed changes within our department and changes to surgeon. Dr. Leonardo leaving at end of August and will not perform surgery. Discussed transitioning to [facility], but pt not wanting to move care at this time. Pt wanting surgery at [facility]. However, based on Dr. Leonardo availability dates with need for 6 week follow up, surgery will not be feasible. Pt information will be sent to [facility] for surgical evaluation. Pt will be notified that surgery at [facility] is not feasible based on timeline.       : total time spent for visit: 33 minutes

## 2024-07-23 ENCOUNTER — TELEPHONE (OUTPATIENT)
Dept: BARIATRICS | Facility: CLINIC | Age: 24
End: 2024-07-23
Payer: COMMERCIAL

## 2024-07-23 NOTE — TELEPHONE ENCOUNTER
Pt referred to our program from Dr. Leonardo, pt is employee of [employer] and had completed workup for a Sleeve with them. 7/1/24 chart notes indicate pt was hesitant to travel to [place] for surgery and was possibly interested in the employee digital medicine program.  Attempted to reach patient to introduce myself and discuss. Left detailed VM with call back numbers and patient portal message also sent.

## 2024-08-26 DIAGNOSIS — R79.89 LOW VITAMIN D LEVEL: ICD-10-CM

## 2024-08-26 RX ORDER — ERGOCALCIFEROL 1.25 MG/1
50000 CAPSULE ORAL
Qty: 12 CAPSULE | Refills: 0 | OUTPATIENT
Start: 2024-08-26

## 2024-08-26 RX ORDER — ERGOCALCIFEROL 1.25 MG/1
50000 CAPSULE ORAL
Qty: 12 CAPSULE | Refills: 0 | Status: SHIPPED | OUTPATIENT
Start: 2024-08-26

## 2024-10-16 ENCOUNTER — TELEPHONE (OUTPATIENT)
Dept: BARIATRICS | Facility: CLINIC | Age: 24
End: 2024-10-16
Payer: COMMERCIAL

## 2024-10-16 NOTE — TELEPHONE ENCOUNTER
M for patient to call us back so we can assist in scheduling an office visit with Dr. Miguel/LYDIA or surgeon of her choice to review her eval done with Dr. Leonardo and discuss moving forward with her surgery.

## 2024-11-01 ENCOUNTER — OCCUPATIONAL HEALTH (OUTPATIENT)
Dept: URGENT CARE | Facility: CLINIC | Age: 24
End: 2024-11-01

## 2024-11-01 DIAGNOSIS — Z00.00 ROUTINE GENERAL MEDICAL EXAMINATION AT A HEALTH CARE FACILITY: Primary | ICD-10-CM

## 2025-01-02 ENCOUNTER — PATIENT MESSAGE (OUTPATIENT)
Dept: ADMINISTRATIVE | Facility: HOSPITAL | Age: 25
End: 2025-01-02
Payer: COMMERCIAL

## 2025-07-02 ENCOUNTER — OFFICE VISIT (OUTPATIENT)
Dept: OBSTETRICS AND GYNECOLOGY | Facility: CLINIC | Age: 25
End: 2025-07-02
Payer: COMMERCIAL

## 2025-07-02 VITALS — DIASTOLIC BLOOD PRESSURE: 84 MMHG | BODY MASS INDEX: 64.43 KG/M2 | SYSTOLIC BLOOD PRESSURE: 128 MMHG | WEIGHT: 293 LBS

## 2025-07-02 DIAGNOSIS — Z12.4 ENCOUNTER FOR SCREENING FOR CERVICAL CANCER: Primary | ICD-10-CM

## 2025-07-02 PROCEDURE — 1159F MED LIST DOCD IN RCRD: CPT | Mod: CPTII,S$GLB,, | Performed by: SPECIALIST

## 2025-07-02 PROCEDURE — 99999 PR PBB SHADOW E&M-EST. PATIENT-LVL III: CPT | Mod: PBBFAC,,, | Performed by: SPECIALIST

## 2025-07-02 PROCEDURE — 3008F BODY MASS INDEX DOCD: CPT | Mod: CPTII,S$GLB,, | Performed by: SPECIALIST

## 2025-07-02 PROCEDURE — 99203 OFFICE O/P NEW LOW 30 MIN: CPT | Mod: S$GLB,,, | Performed by: SPECIALIST

## 2025-07-02 PROCEDURE — 3074F SYST BP LT 130 MM HG: CPT | Mod: CPTII,S$GLB,, | Performed by: SPECIALIST

## 2025-07-02 PROCEDURE — 3079F DIAST BP 80-89 MM HG: CPT | Mod: CPTII,S$GLB,, | Performed by: SPECIALIST

## 2025-07-03 NOTE — PROGRESS NOTES
24 yo BF G0 presents for annual gyn eval No overt gyn complaints  Past Medical History:   Diagnosis Date    Asthma        Past Surgical History:   Procedure Laterality Date    OPEN REDUCTION AND INTERNAL FIXATION (ORIF) OF INJURY OF FOOT Left 2/10/2022    Procedure: ORIF, FOOT;  Surgeon: Avel Ruiz DPM;  Location: [facility];  Service: Podiatry;  Laterality: Left;    WISDOM TOOTH EXTRACTION         Family History   Problem Relation Name Age of Onset    Hypertension Sister      Breast cancer Neg Hx      Cancer Neg Hx      Colon cancer Neg Hx      Miscarriages / Stillbirths Neg Hx      Diabetes Neg Hx         Social History     Socioeconomic History    Marital status: Single   Tobacco Use    Smoking status: Former     Types: Vaping with nicotine     Passive exposure: Past    Smokeless tobacco: Never    Tobacco comments:     Quit date 06/07/2024.cg   Substance and Sexual Activity    Alcohol use: No    Drug use: No    Sexual activity: Yes     Partners: Male     Birth control/protection: None     Social Drivers of Health     Financial Resource Strain: Medium Risk (3/6/2024)    Overall Financial Resource Strain (CARDIA)     Difficulty of Paying Living Expenses: Somewhat hard   Food Insecurity: No Food Insecurity (3/6/2024)    Hunger Vital Sign     Worried About Running Out of Food in the Last Year: Never true     Ran Out of Food in the Last Year: Never true   Transportation Needs: No Transportation Needs (3/6/2024)    PRAPARE - Transportation     Lack of Transportation (Medical): No     Lack of Transportation (Non-Medical): No   Physical Activity: Sufficiently Active (3/6/2024)    Exercise Vital Sign     Days of Exercise per Week: 4 days     Minutes of Exercise per Session: 100 min   Stress: No Stress Concern Present (3/6/2024)    [employer] [place] [employer] - Occupational Stress Questionnaire     Feeling of Stress : Only a little   Housing Stability: Low Risk  (3/6/2024)    Housing Stability Vital Sign      Unable to Pay for Housing in the Last Year: No     Number of Places Lived in the Last Year: 1     Unstable Housing in the Last Year: No       Current Medications[1]    Review of patient's allergies indicates:   Allergen Reactions    Banana        Review of System:   General: no chills, fever, night sweats, weight gain or weight loss  Psychological: no depression or suicidal ideation  Breasts: no new or changing breast lumps, nipple discharge or masses.  Respiratory: no cough, shortness of breath, or wheezing  Cardiovascular: no chest pain or dyspnea on exertion  Gastrointestinal: no abdominal pain, change in bowel habits, or black or bloody stools  Genito-Urinary: no incontinence, urinary frequency/urgency or vulvar/vaginal symptoms, pelvic pain or abnormal vaginal bleeding.  Musculoskeletal: no gait disturbance or muscular weakness                                               General Appearance    A and O x 4, Cooperative, no distress   Breasts    Abdomen   Symmetrical, no masses, no discharge, skin changes , erythema or retraction. No adenopathy  Soft, non-tender, bowel sounds active all four quadrants,  no masses, no organomegaly    Genitourinary:   External rectal exam shows no thrombosed external hemorrhoids.   Pelvic exam was performed with patient supine.  No labial fusion.  There is no rash, lesion or injury on the right labia.  There is no rash, lesion or injury on the left labia.  No bleeding and no signs of injury around the vaginal introitus, urethra is without lesions and well supported. The cervix is visualized with no discharge, lesions or friability.  No vaginal discharge found.  No significant Cystocele, Enterocele or rectocele, and uterus well supported.  Bimanual exam:  The urethra is normal to palpation and there are no palpable vaginal wall masses.  Uterus is not deviated, not enlarged, not fixed, normal shape and not tender.  Cervix exhibits no motion tenderness.   Right adnexum displays no  mass and no tenderness.  Left adnexum displays no mass and no tenderness.   Extremities: Extremities normal, atraumatic, no cyanosis or edema                     NOTE  NURSING PERSONAL PRESENT FOR ENTIRE PHYSICAL EXAM     PAP submitted    Plan  Will follow pap   BSE monthly  RTO 2 years/prn    I spent a total of 30 minutes on the day of the visit. This includes face to face time and non-face to face time preparing to see the patient (eg, review of tests), obtaining and/or reviewing separately obtained history, documenting clinical information in the electronic or other health record, independently interpreting results and communicating results to the patient/family/caregiver, or care coordinator.            [1]   Current Outpatient Medications   Medication Sig Dispense Refill    ergocalciferol (ERGOCALCIFEROL) 50,000 unit Cap TAKE 1 CAPSULE BY MOUTH EVERY 7 DAYS (Patient not taking: Reported on 7/2/2025) 12 capsule 0    hydrOXYzine pamoate (VISTARIL) 25 MG Cap Take 1 capsule (25 mg total) by mouth every 6 (six) hours as needed (itching). (Patient not taking: Reported on 7/2/2025) 30 capsule 0    triamcinolone acetonide 0.1% (KENALOG) 0.1 % cream Apply topically 2 (two) times daily. (Patient not taking: Reported on 7/2/2025) 80 g 0     No current facility-administered medications for this visit.

## 2025-07-09 ENCOUNTER — OFFICE VISIT (OUTPATIENT)
Dept: FAMILY MEDICINE | Facility: CLINIC | Age: 25
End: 2025-07-09
Payer: COMMERCIAL

## 2025-07-09 VITALS
OXYGEN SATURATION: 98 % | WEIGHT: 293 LBS | BODY MASS INDEX: 59.07 KG/M2 | HEIGHT: 59 IN | TEMPERATURE: 99 F | DIASTOLIC BLOOD PRESSURE: 66 MMHG | HEART RATE: 104 BPM | SYSTOLIC BLOOD PRESSURE: 110 MMHG

## 2025-07-09 DIAGNOSIS — F17.290 NICOTINE DEPENDENCE DUE TO VAPING TOBACCO PRODUCT: ICD-10-CM

## 2025-07-09 DIAGNOSIS — F32.A DEPRESSION, UNSPECIFIED DEPRESSION TYPE: Primary | ICD-10-CM

## 2025-07-09 DIAGNOSIS — R79.89 LOW VITAMIN D LEVEL: ICD-10-CM

## 2025-07-09 PROCEDURE — 99999 PR PBB SHADOW E&M-EST. PATIENT-LVL IV: CPT | Mod: PBBFAC,,, | Performed by: NURSE PRACTITIONER

## 2025-07-09 PROCEDURE — 1159F MED LIST DOCD IN RCRD: CPT | Mod: CPTII,S$GLB,, | Performed by: NURSE PRACTITIONER

## 2025-07-09 PROCEDURE — 3078F DIAST BP <80 MM HG: CPT | Mod: CPTII,S$GLB,, | Performed by: NURSE PRACTITIONER

## 2025-07-09 PROCEDURE — 99406 BEHAV CHNG SMOKING 3-10 MIN: CPT | Mod: S$GLB,,, | Performed by: NURSE PRACTITIONER

## 2025-07-09 PROCEDURE — 3074F SYST BP LT 130 MM HG: CPT | Mod: CPTII,S$GLB,, | Performed by: NURSE PRACTITIONER

## 2025-07-09 PROCEDURE — 99213 OFFICE O/P EST LOW 20 MIN: CPT | Mod: 25,S$GLB,, | Performed by: NURSE PRACTITIONER

## 2025-07-09 PROCEDURE — 1160F RVW MEDS BY RX/DR IN RCRD: CPT | Mod: CPTII,S$GLB,, | Performed by: NURSE PRACTITIONER

## 2025-07-09 PROCEDURE — 3008F BODY MASS INDEX DOCD: CPT | Mod: CPTII,S$GLB,, | Performed by: NURSE PRACTITIONER

## 2025-07-09 RX ORDER — VIT C/E/ZN/COPPR/LUTEIN/ZEAXAN 250MG-90MG
5000 CAPSULE ORAL DAILY
Qty: 90 CAPSULE | Refills: 3 | Status: SHIPPED | OUTPATIENT
Start: 2025-07-09

## 2025-07-09 RX ORDER — BUPROPION HYDROCHLORIDE 150 MG/1
150 TABLET ORAL DAILY
Qty: 30 TABLET | Refills: 2 | Status: SHIPPED | OUTPATIENT
Start: 2025-07-09 | End: 2026-07-09

## 2025-07-09 NOTE — PATIENT INSTRUCTIONS
Alvarado Callejas,     If you are due for any health screening(s) below please notify me so we can arrange them to be ordered and scheduled. Most healthy patients at your age complete them, but you are free to accept or refuse.     If you can't do it, I'll definitely understand. If you can, I'd certainly appreciate it!    All of your core healthy metrics are met.

## 2025-07-09 NOTE — PROGRESS NOTES
Subjective:       Patient ID: Britta Mariee is a 25 y.o. female.    Chief Complaint: Anxiety and Depression    History of Present Illness    CHIEF COMPLAINT:  Patient presents with symptoms of depression and anxiety following recent traumatic events in her family.    HPI:  Patient reports significant emotional distress following traumatic events earlier this year. In February, her aunt and young cousin were killed by her aunt's brother, and she witnessed the aftermath. This event has deeply affected her, causing fear of sleeping at night due to concerns about potential harm. She reports staying awake all night and pacing. Patient also mentions her grandfather's recent passing, adding to her emotional burden.    These events have significantly impacted her daily functioning. She has been frequently absent from work, missing 2 weeks recently. At work, she no longer communicates, answers her phone, or responds to emails. Patient expresses concern about potential job loss due to her current state.    Patient describes profound sadness and a significant decline in her overall well-being, persisting since the traumatic events occurred earlier in the year. She has not previously tried any medication or counseling for these symptoms, except for some counseling at age 14.    Patient mentions a history of vitamin D deficiency. She reports previously developing a severe rash on her chest, stomach, and face when taking a prescribed vitamin D supplement.    FAMILY HISTORY:  Family history is significant for aunt who was killed by her brother (stabbed to death). Her cousin was also killed by the patient's uncle (stabbed to death). Patient's grandfather is .    TEST RESULTS:  Patient's Vitamin D levels were noted to be low last year.    ALLERGIES:  Patient reports an allergic reaction to Vitamin D2, which causes a rash on her chest, stomach, and face.    SOCIAL HISTORY:  Vaping: Trying to stop Occupation:  Employed      ROS:  General: -fever, -chills, -fatigue, -weight gain, -weight loss  Eyes: -vision changes, -redness, -discharge  ENT: -ear pain, -nasal congestion, -sore throat  Cardiovascular: -chest pain, -palpitations, -lower extremity edema  Respiratory: -cough, -shortness of breath  Gastrointestinal: -abdominal pain, -nausea, -vomiting, -diarrhea, -constipation, -blood in stool  Genitourinary: -dysuria, -hematuria, -frequency  Musculoskeletal: -joint pain, -muscle pain  Skin: +rash, -lesion  Neurological: -headache, -dizziness, -numbness, -tingling, +difficulty falling asleep  Psychiatric: +anxiety, +depression, +sleep difficulty, +inner restlessness, +feeling isolated         Past Medical History:   Diagnosis Date    Asthma         Past Surgical History:   Procedure Laterality Date    OPEN REDUCTION AND INTERNAL FIXATION (ORIF) OF INJURY OF FOOT Left 2/10/2022    Procedure: ORIF, FOOT;  Surgeon: Avel Ruiz DPM;  Location[facility];  Service: Podiatry;  Laterality: Left;    WISDOM TOOTH EXTRACTION         Family History   Problem Relation Name Age of Onset    Asthma Mother      Asthma Father      Hypertension Father      No Known Problems Maternal Grandmother      No Known Problems Paternal Grandmother      Cancer Paternal Grandfather      Breast cancer Neg Hx      Colon cancer Neg Hx      Miscarriages / Stillbirths Neg Hx      Diabetes Neg Hx         Social History     Socioeconomic History    Marital status: Single   Tobacco Use    Smoking status: Former     Types: Vaping with nicotine     Passive exposure: Past    Smokeless tobacco: Never    Tobacco comments:     Quit date 06/07/2024.cg   Substance and Sexual Activity    Alcohol use: Yes    Drug use: No    Sexual activity: Yes     Partners: Male     Birth control/protection: None     Social Drivers of Health     Financial Resource Strain: Medium Risk (7/8/2025)    Overall Financial Resource Strain (CARDIA)     Difficulty of Paying Living Expenses: Somewhat  "hard   Food Insecurity: Patient Declined (7/8/2025)    Hunger Vital Sign     Worried About Running Out of Food in the Last Year: Patient declined     Ran Out of Food in the Last Year: Patient declined   Transportation Needs: Patient Declined (7/8/2025)    PRAPARE - Transportation     Lack of Transportation (Medical): Patient declined     Lack of Transportation (Non-Medical): Patient declined   Physical Activity: Insufficiently Active (7/8/2025)    Exercise Vital Sign     Days of Exercise per Week: 3 days     Minutes of Exercise per Session: 20 min   Stress: Stress Concern Present (7/8/2025)    [employer] [place] [employer] - Occupational Stress Questionnaire     Feeling of Stress : Very much   Housing Stability: Unknown (7/8/2025)    Housing Stability Vital Sign     Unable to Pay for Housing in the Last Year: Patient declined     Number of Times Moved in the Last Year: 0     Homeless in the Last Year: No       Current Medications[1]    Review of patient's allergies indicates:   Allergen Reactions    Banana      Objective:      Blood pressure 110/66, pulse 104, temperature 98.7 °F (37.1 °C), height 4' 11" (1.499 m), weight (!) 143.5 kg (316 lb 5.8 oz), last menstrual period 06/23/2025, SpO2 98%. Body mass index is 63.9 kg/m².   Physical Exam    General: No acute distress. Well-developed. Well-nourished. BMI 63.90  Eyes: EOMI. Sclerae anicteric.  HENT: Normocephalic. Atraumatic. Nares patent. Moist oral mucosa.  Cardiovascular: Regular rate. Regular rhythm. No murmurs. No rubs. No gallops. Normal S1, S2.  Respiratory: Normal respiratory effort. Clear to auscultation bilaterally. No rales. No rhonchi. No wheezing.  Abdomen: Soft. Non-distended.   Musculoskeletal: No  obvious deformity.  Extremities: No lower extremity edema.  Neurological: Alert & oriented x3. No slurred speech. Normal gait.  Psychiatric: Depressed mood. Tearful affect. Good insight. Good judgment.  Skin: Warm. Dry. No rash.     "     Assessment:       Assessment & Plan    - Assessed mental health status following recent traumatic events and ongoing grief.    DEPRESSION:  - Patient reports feeling sad, scared to sleep, and staying up all night pacing due to fear.  - Depression is affecting work performance, including not talking at work, not answering phone or emails, and missing evaluations, as well as personal relationships.  - Explained differences between Wellbutrin and SSRIs, including mechanism of action and potential side effects.  - Prescribed Wellbutrin  mg daily in the morning, with or without food, to help with mood and focus.  - Instructed patient to take the medication whole, not to break, cut, or crush it.  - Selected Wellbutrin due to its stimulant-like properties and potential benefits for focus and mood elevation.    ANXIETY:  - Patient reports anxiety about going to sleep, fear of someone coming in, and staying up all night pacing.  - Anxiety is affecting sleep and work performance, leading to taking time off work.  - Assessed anxiety symptoms and potential triggers.  - Considering Wellbutrin for anxiety treatment, with caution about potential worsening of anxiety symptoms.  - Instructed patient to contact the office if anxiety symptoms worsen while on Wellbutrin.    INSOMNIA:  - Patient reports staying up all night due to fear and anxiety, affecting ability to sleep and function at work, leading to taking time off.  - Assessed insomnia symptoms and potential triggers.  - Considering Wellbutrin for mood and focus, with caution about potential worsening of insomnia symptoms.    VITAMIN D DEFICIENCY AND ALLERGY:  - Patient reports breaking out in a rash after taking prescribed vitamin D.  - Vitamin D level was checked last year, and it is too soon to recheck.  - Assessed vitamin D allergy and advised switching to OTC vitamin D3 as an alternative to previously prescribed vitamin D2.  - Recommend trying OTC vitamin D3  5,000 units daily if insurance does not cover prescribed vitamin D.    NICOTINE DEPENDENCE:  - Patient is trying to stop vaping.  - Considering Wellbutrin to help with nicotine addiction.    GRIEF AND FAMILY DEATHS:  - Patient reports multiple family deaths, including aunt, cousin, and grandpa, affecting mental health and contributing to depression and anxiety.  - Assessed the impact of family deaths on patient's mental health.  - Discussed the grief process and importance of going through appropriate steps for mental recovery.    COUNSELING:  - Advised patient to consider attending counseling sessions to work through grief and develop coping mechanisms for anxiety.  - Offered to refer patient for counseling sessions to help with mental health issues.    FOLLOW-UP:  - Scheduled follow up in 6 weeks to assess medication effectiveness.       Plan:       Britta was seen today for anxiety and depression.    Diagnoses and all orders for this visit:    Depression, unspecified depression type  -     buPROPion (WELLBUTRIN XL) 150 MG TB24 tablet; Take 1 tablet (150 mg total) by mouth once daily.  -     Ambulatory referral/consult to Psychology; Future    Low vitamin D level  -     cholecalciferol, vitamin D3, 125 mcg (5,000 unit) capsule; Take 1 capsule (5,000 Units total) by mouth once daily.    Nicotine dependence due to vaping tobacco product  Smoking cessation advised  3 minutes of counseling completed    BMI 60.0-69.9, adult  The patient is asked to make an attempt to improve diet and exercise patterns to aid in medical management of this problem.           This note was generated with the assistance of ambient listening technology. Verbal consent was obtained by the patient and accompanying visitor(s) for the recording of patient appointment to facilitate this note. I attest to having reviewed and edited the generated note for accuracy, though some syntax or spelling errors may persist. Please contact the author of  this note for any clarification.             [1]   Current Outpatient Medications   Medication Sig Dispense Refill    buPROPion (WELLBUTRIN XL) 150 MG TB24 tablet Take 1 tablet (150 mg total) by mouth once daily. 30 tablet 2    cholecalciferol, vitamin D3, 125 mcg (5,000 unit) capsule Take 1 capsule (5,000 Units total) by mouth once daily. 90 capsule 3    hydrOXYzine pamoate (VISTARIL) 25 MG Cap Take 1 capsule (25 mg total) by mouth every 6 (six) hours as needed (itching). (Patient not taking: Reported on 7/9/2025) 30 capsule 0    triamcinolone acetonide 0.1% (KENALOG) 0.1 % cream Apply topically 2 (two) times daily. (Patient not taking: Reported on 7/9/2025) 80 g 0     No current facility-administered medications for this visit.

## 2025-08-06 ENCOUNTER — E-VISIT (OUTPATIENT)
Dept: FAMILY MEDICINE | Facility: CLINIC | Age: 25
End: 2025-08-06
Payer: COMMERCIAL

## 2025-08-06 DIAGNOSIS — F32.A DEPRESSION, UNSPECIFIED DEPRESSION TYPE: ICD-10-CM

## 2025-08-06 RX ORDER — BUPROPION HYDROCHLORIDE 300 MG/1
300 TABLET ORAL DAILY
Qty: 30 TABLET | Refills: 1 | Status: SHIPPED | OUTPATIENT
Start: 2025-08-06 | End: 2026-08-06

## 2025-08-06 NOTE — PROGRESS NOTES
Patient ID: Britta Mariee is a 25 y.o. female.        E-Visit Time Tracking:   Day 1 Time (in minutes): 10  Total Time (in minutes): 10      Chief Complaint: General Illness (Entered automatically based on patient selection in [employer].)      The patient initiated a request through [employer] on 8/6/2025 for evaluation and management with a chief complaint of General Illness (Entered automatically based on patient selection in [employer].)     I evaluated the questionnaire submission on 08/06/2025.    [facility]-Medication    8/6/2025 11:01 AM CDT - Filed by Patient   What do you need help with? Medication Request   Do you agree to participate in an E-Visit? Yes   If you have any of the following symptoms, please present to your local emergency room or call 911:  I acknowledge   Medication requests for narcotics will not be addressed via an E-Visit.  Please schedule an appointment. I acknowledge   Do you have any of the following pregnancy-related conditions? (Pregnant, Possibly pregnant, Breast feeding, None) None   Do you want to address a new or existing medication? (Start a new medication, Address a current medication) Address a current medication   What is the main issue you would like addressed today? I wanted to get my dosage adjusted. I like tge medicine however i feel its more effective when i take it twice a day and wanted to know if its okay or not. it works for me but ill run out faster because its meant to be used once a day.   Would you like to change or continue your medication? (Change medication, Continue medication) Continue medication   What is the name of the medication you would like to continue? Bupropion XL   Are you taking your medication as prescribed? No   What is your current dose? 150   How often do you take your medication? (Once daily, Twice daily, Three times daily, Four times daily, Five times daily, Less than once a week, Once a week, More than once a week) Twice  daily   Which option below best describes the reason for your request? (Renew refills, Prior authorization is required) Renew refills    What medical condition is the  medication intended to treat? Depression   Has the medication helped your condition? (Yes, No, Not sure) Yes   Have you experienced any side effects from the medication? No   Provide any information you feel is important to your history not asked above I find that im struggling to go to sleep but it may be due to taking my medicine at different times   Please attach any relevant images or files    Are you able to take your vitals? No         Encounter Diagnosis   Name Primary?    Depression, unspecified depression type         No orders of the defined types were placed in this encounter.     Medications Ordered This Encounter   Medications    buPROPion (WELLBUTRIN XL) 300 MG 24 hr tablet     Sig: Take 1 tablet (300 mg total) by mouth once daily.     Dispense:  30 tablet     Refill:  1        Follow up in about 15 days (around 8/21/2025) for depression.

## 2025-08-20 ENCOUNTER — TELEPHONE (OUTPATIENT)
Dept: FAMILY MEDICINE | Facility: CLINIC | Age: 25
End: 2025-08-20
Payer: COMMERCIAL

## 2025-08-20 ENCOUNTER — OFFICE VISIT (OUTPATIENT)
Dept: FAMILY MEDICINE | Facility: CLINIC | Age: 25
End: 2025-08-20
Payer: COMMERCIAL

## 2025-08-20 DIAGNOSIS — R06.02 SHORTNESS OF BREATH: ICD-10-CM

## 2025-08-20 DIAGNOSIS — J02.9 SORE THROAT: Primary | ICD-10-CM

## 2025-08-20 DIAGNOSIS — R05.9 COUGH, UNSPECIFIED TYPE: ICD-10-CM

## 2025-08-20 DIAGNOSIS — R09.89 CHEST CONGESTION: ICD-10-CM

## 2025-08-20 DIAGNOSIS — F51.01 PRIMARY INSOMNIA: ICD-10-CM

## 2025-08-20 LAB
CTP QC/QA: YES
CTP QC/QA: YES
MOLECULAR STREP A: NEGATIVE
SARS-COV-2 RDRP RESP QL NAA+PROBE: NEGATIVE

## 2025-08-20 PROCEDURE — 99213 OFFICE O/P EST LOW 20 MIN: CPT | Mod: S$GLB,,, | Performed by: NURSE PRACTITIONER

## 2025-08-20 PROCEDURE — 1159F MED LIST DOCD IN RCRD: CPT | Mod: CPTII,S$GLB,, | Performed by: NURSE PRACTITIONER

## 2025-08-20 PROCEDURE — 99999 PR PBB SHADOW E&M-EST. PATIENT-LVL III: CPT | Mod: PBBFAC,,, | Performed by: NURSE PRACTITIONER

## 2025-08-20 PROCEDURE — 87635 SARS-COV-2 COVID-19 AMP PRB: CPT | Mod: QW,S$GLB,, | Performed by: NURSE PRACTITIONER

## 2025-08-20 PROCEDURE — 3074F SYST BP LT 130 MM HG: CPT | Mod: CPTII,S$GLB,, | Performed by: NURSE PRACTITIONER

## 2025-08-20 PROCEDURE — 1160F RVW MEDS BY RX/DR IN RCRD: CPT | Mod: CPTII,S$GLB,, | Performed by: NURSE PRACTITIONER

## 2025-08-20 PROCEDURE — 3008F BODY MASS INDEX DOCD: CPT | Mod: CPTII,S$GLB,, | Performed by: NURSE PRACTITIONER

## 2025-08-20 PROCEDURE — 87651 STREP A DNA AMP PROBE: CPT | Mod: QW,S$GLB,, | Performed by: NURSE PRACTITIONER

## 2025-08-20 PROCEDURE — 3079F DIAST BP 80-89 MM HG: CPT | Mod: CPTII,S$GLB,, | Performed by: NURSE PRACTITIONER

## 2025-08-20 RX ORDER — ALBUTEROL SULFATE 90 UG/1
2 INHALANT RESPIRATORY (INHALATION) EVERY 6 HOURS PRN
Qty: 18 G | Refills: 0 | Status: SHIPPED | OUTPATIENT
Start: 2025-08-20 | End: 2026-08-20

## 2025-08-20 RX ORDER — TRAZODONE HYDROCHLORIDE 50 MG/1
50 TABLET ORAL NIGHTLY
Qty: 30 TABLET | Refills: 11 | Status: SHIPPED | OUTPATIENT
Start: 2025-08-20 | End: 2026-08-20

## 2025-08-21 VITALS
HEART RATE: 100 BPM | TEMPERATURE: 99 F | RESPIRATION RATE: 20 BRPM | WEIGHT: 293 LBS | DIASTOLIC BLOOD PRESSURE: 84 MMHG | HEIGHT: 59 IN | BODY MASS INDEX: 59.07 KG/M2 | SYSTOLIC BLOOD PRESSURE: 126 MMHG

## 2025-08-25 ENCOUNTER — E-VISIT (OUTPATIENT)
Dept: FAMILY MEDICINE | Facility: CLINIC | Age: 25
End: 2025-08-25
Payer: COMMERCIAL

## 2025-08-25 DIAGNOSIS — F41.9 ANXIETY: Primary | ICD-10-CM

## 2025-08-25 RX ORDER — ESCITALOPRAM OXALATE 10 MG/1
10 TABLET ORAL DAILY
Qty: 90 TABLET | Refills: 3 | Status: SHIPPED | OUTPATIENT
Start: 2025-08-25 | End: 2026-08-25

## (undated) DEVICE — WALKER ANKLE MEDIUM

## (undated) DEVICE — SPONGE GAUZE 10S 4X4  442214

## (undated) DEVICE — DRAPE C-ARM (FITS NEW C-ARM) 07-CA108

## (undated) DEVICE — STERISTRIP 1/2 R1547

## (undated) DEVICE — SYRINGE 10ML 302995

## (undated) DEVICE — TRAY LOWER EXTREMITY  SMHS029-05

## (undated) DEVICE — PAD BOVIE ADULT

## (undated) DEVICE — SUTURE VICRYL 2-0 RB-1 27 J306H

## (undated) DEVICE — SUTURE VICRYL 4-0 RB-1 27 J214H

## (undated) DEVICE — NEEDLE SAFETY ECLIPSE 25G 1-1/2IN 305767

## (undated) DEVICE — BANDAGE KERLIX   441106

## (undated) DEVICE — ADHESIVE MASTISOL VIAL 0523-48

## (undated) DEVICE — Device

## (undated) DEVICE — BANDAGE ACE STERILE 4 REB3114

## (undated) DEVICE — CUFF TOURNIQUET 18DUAL PRT 5921-218-235

## (undated) DEVICE — GLOVE BIOGEL PI ULTRA TOUCH GRAY SZ7.5

## (undated) DEVICE — BLADE SCALPEL #15 371115

## (undated) DEVICE — DRAPE C-ARMOR FLOUROSCAN IMAGING 5523

## (undated) DEVICE — BANDAGE ESMARK 4X9 55514

## (undated) DEVICE — SOLUTION IRRI NS BOTTLE 1000ML R5200-01

## (undated) DEVICE — SUTURE VICRYL 3-0 RB-1 27 J215H